# Patient Record
Sex: MALE | Race: WHITE | NOT HISPANIC OR LATINO | Employment: OTHER | URBAN - METROPOLITAN AREA
[De-identification: names, ages, dates, MRNs, and addresses within clinical notes are randomized per-mention and may not be internally consistent; named-entity substitution may affect disease eponyms.]

---

## 2023-09-05 ENCOUNTER — HOSPITAL ENCOUNTER (OUTPATIENT)
Dept: DATA CONVERSION | Facility: HOSPITAL | Age: 88
Discharge: HOME | End: 2023-09-05
Payer: MEDICARE

## 2023-09-05 DIAGNOSIS — Z01.89 ENCOUNTER FOR OTHER SPECIFIED SPECIAL EXAMINATIONS: ICD-10-CM

## 2023-09-05 DIAGNOSIS — I10 ESSENTIAL (PRIMARY) HYPERTENSION: ICD-10-CM

## 2023-09-05 LAB
ALBUMIN SERPL-MCNC: 4.3 GM/DL (ref 3.5–5)
ALBUMIN/GLOB SERPL: 1.7 RATIO (ref 1.5–3)
ALP BLD-CCNC: 111 U/L (ref 35–125)
ALT SERPL-CCNC: 14 U/L (ref 5–40)
ANION GAP SERPL CALCULATED.3IONS-SCNC: 13 MMOL/L (ref 0–19)
AST SERPL-CCNC: 18 U/L (ref 5–40)
BASOPHILS # BLD AUTO: 0.04 K/UL (ref 0–0.22)
BASOPHILS NFR BLD AUTO: 0.7 % (ref 0–1)
BILIRUB DIRECT SERPL-MCNC: 0.2 MG/DL (ref 0–0.2)
BILIRUB INDIRECT SERPL-MCNC: 0.3 MG/DL (ref 0–0.8)
BILIRUB SERPL-MCNC: 0.5 MG/DL (ref 0.1–1.2)
BUN SERPL-MCNC: 32 MG/DL (ref 8–25)
BUN/CREAT SERPL: 20 RATIO (ref 8–21)
CALCIUM SERPL-MCNC: 9.3 MG/DL (ref 8.5–10.4)
CHLORIDE SERPL-SCNC: 102 MMOL/L (ref 97–107)
CO2 SERPL-SCNC: 26 MMOL/L (ref 24–31)
CREAT SERPL-MCNC: 1.6 MG/DL (ref 0.4–1.6)
DEPRECATED RDW RBC AUTO: 51.1 FL (ref 37–54)
DIFFERENTIAL METHOD BLD: ABNORMAL
EOSINOPHIL # BLD AUTO: 0.15 K/UL (ref 0–0.45)
EOSINOPHIL NFR BLD: 2.6 % (ref 0–3)
ERYTHROCYTE [DISTWIDTH] IN BLOOD BY AUTOMATED COUNT: 15.2 % (ref 11.7–15)
GFR SERPL CREATININE-BSD FRML MDRD: 40 ML/MIN/1.73 M2
GLOBULIN SER-MCNC: 2.5 G/DL (ref 1.9–3.7)
GLUCOSE SERPL-MCNC: 97 MG/DL (ref 65–99)
HCT VFR BLD AUTO: 38.7 % (ref 41–50)
HGB BLD-MCNC: 12 GM/DL (ref 13.5–16.5)
IMM GRANULOCYTES # BLD AUTO: 0.01 K/UL (ref 0–0.1)
LYMPHOCYTES # BLD AUTO: 1.04 K/UL (ref 1.2–3.2)
LYMPHOCYTES NFR BLD MANUAL: 18.1 % (ref 20–40)
MCH RBC QN AUTO: 28.3 PG (ref 26–34)
MCHC RBC AUTO-ENTMCNC: 31 % (ref 31–37)
MCV RBC AUTO: 91.3 FL (ref 80–100)
MONOCYTES # BLD AUTO: 0.42 K/UL (ref 0–0.8)
MONOCYTES NFR BLD MANUAL: 7.3 % (ref 0–8)
NEUTROPHILS # BLD AUTO: 4.09 K/UL
NEUTROPHILS # BLD AUTO: 4.09 K/UL (ref 1.8–7.7)
NEUTROPHILS.IMMATURE NFR BLD: 0.2 % (ref 0–1)
NEUTS SEG NFR BLD: 71.1 % (ref 50–70)
NRBC BLD-RTO: 0 /100 WBC
PLATELET # BLD AUTO: 161 K/UL (ref 150–450)
PMV BLD AUTO: 10.8 CU (ref 7–12.6)
POTASSIUM SERPL-SCNC: 4.4 MMOL/L (ref 3.4–5.1)
PROT SERPL-MCNC: 6.8 G/DL (ref 5.9–7.9)
RBC # BLD AUTO: 4.24 M/UL (ref 4.5–5.5)
SODIUM SERPL-SCNC: 141 MMOL/L (ref 133–145)
WBC # BLD AUTO: 5.8 K/UL (ref 4.5–11)

## 2023-09-17 PROBLEM — R26.9 ABNORMAL GAIT: Status: ACTIVE | Noted: 2023-09-17

## 2023-09-17 PROBLEM — M25.069 HEMARTHROSIS OF KNEE: Status: ACTIVE | Noted: 2023-09-17

## 2023-09-17 PROBLEM — T78.3XXA ANGIOEDEMA: Status: ACTIVE | Noted: 2023-09-17

## 2023-09-17 PROBLEM — I25.10 ATHEROSCLEROTIC HEART DISEASE OF NATIVE CORONARY ARTERY WITHOUT ANGINA PECTORIS: Status: ACTIVE | Noted: 2023-09-17

## 2023-09-17 PROBLEM — H81.10 BENIGN PAROXYSMAL POSITIONAL VERTIGO: Status: ACTIVE | Noted: 2023-09-17

## 2023-09-17 PROBLEM — E78.2 MIXED HYPERLIPIDEMIA: Status: ACTIVE | Noted: 2023-09-17

## 2023-09-17 PROBLEM — R42 DIZZINESS AND GIDDINESS: Status: ACTIVE | Noted: 2023-09-17

## 2023-09-17 PROBLEM — R41.3 MEMORY IMPAIRMENT: Status: ACTIVE | Noted: 2023-09-17

## 2023-09-17 PROBLEM — E55.9 VITAMIN D DEFICIENCY: Status: ACTIVE | Noted: 2023-09-17

## 2023-09-17 PROBLEM — Z95.5 HISTORY OF CORONARY ARTERY STENT PLACEMENT: Status: ACTIVE | Noted: 2023-09-17

## 2023-09-17 PROBLEM — R47.81 SLURRED SPEECH: Status: ACTIVE | Noted: 2023-09-17

## 2023-09-17 PROBLEM — M15.0 PRIMARY OSTEOARTHRITIS INVOLVING MULTIPLE JOINTS: Status: ACTIVE | Noted: 2023-09-17

## 2023-09-17 PROBLEM — M15.9 PRIMARY OSTEOARTHRITIS INVOLVING MULTIPLE JOINTS: Status: ACTIVE | Noted: 2023-09-17

## 2023-09-17 PROBLEM — R42 LIGHTHEADEDNESS: Status: ACTIVE | Noted: 2023-09-17

## 2023-09-17 PROBLEM — R73.03 PREDIABETES: Status: ACTIVE | Noted: 2023-09-17

## 2023-09-17 PROBLEM — I48.91 ATRIAL FIBRILLATION (MULTI): Status: ACTIVE | Noted: 2023-09-17

## 2023-09-17 PROBLEM — N18.9 CHRONIC RENAL FAILURE SYNDROME: Status: ACTIVE | Noted: 2023-09-17

## 2023-09-17 PROBLEM — I87.2 VENOUS INSUFFICIENCY: Status: ACTIVE | Noted: 2023-09-17

## 2023-09-17 PROBLEM — M85.00 FIBROUS DYSPLASIA, MONOSTOTIC: Status: ACTIVE | Noted: 2023-09-17

## 2023-09-17 PROBLEM — T14.8XXA HEMATOMA: Status: ACTIVE | Noted: 2023-09-17

## 2023-09-17 PROBLEM — I10 ESSENTIAL HYPERTENSION: Status: ACTIVE | Noted: 2023-09-17

## 2023-09-17 PROBLEM — K40.90 RIGHT INGUINAL HERNIA: Status: ACTIVE | Noted: 2023-09-17

## 2023-09-17 PROBLEM — R42 VERTIGO: Status: ACTIVE | Noted: 2023-09-17

## 2023-09-17 PROBLEM — N40.0 BENIGN PROSTATIC HYPERPLASIA: Status: ACTIVE | Noted: 2023-09-17

## 2023-09-17 PROBLEM — H90.3 SENSORINEURAL HEARING LOSS, BILATERAL: Status: ACTIVE | Noted: 2023-09-17

## 2023-09-17 RX ORDER — CEPHALEXIN 500 MG/1
500 CAPSULE ORAL 2 TIMES DAILY
COMMUNITY
Start: 2023-04-08 | End: 2023-11-09 | Stop reason: ALTCHOICE

## 2023-09-17 RX ORDER — AMLODIPINE BESYLATE 5 MG/1
TABLET ORAL
COMMUNITY
End: 2024-03-04 | Stop reason: SDUPTHER

## 2023-09-17 RX ORDER — VIT A/VIT C/VIT E/ZINC/COPPER 2148-113
TABLET ORAL
COMMUNITY

## 2023-09-17 RX ORDER — TORSEMIDE 20 MG/1
30 TABLET ORAL DAILY
COMMUNITY
Start: 2023-06-19 | End: 2024-03-04 | Stop reason: SDUPTHER

## 2023-09-17 RX ORDER — ERGOCALCIFEROL 1.25 MG/1
CAPSULE ORAL
COMMUNITY
Start: 2023-08-17 | End: 2024-02-07

## 2023-09-17 RX ORDER — CHOLECALCIFEROL (VITAMIN D3) 125 MCG
TABLET ORAL
COMMUNITY
End: 2023-11-09 | Stop reason: CLARIF

## 2023-09-17 RX ORDER — FINASTERIDE 5 MG/1
5 TABLET, FILM COATED ORAL DAILY
COMMUNITY
End: 2024-03-04 | Stop reason: SDUPTHER

## 2023-09-17 RX ORDER — AMLODIPINE BESYLATE 10 MG/1
10 TABLET ORAL DAILY
COMMUNITY
End: 2024-03-04 | Stop reason: ALTCHOICE

## 2023-09-17 RX ORDER — CHOLECALCIFEROL (VITAMIN D3) 25 MCG
TABLET ORAL
COMMUNITY
Start: 2023-03-03

## 2023-09-17 RX ORDER — SIMVASTATIN 20 MG/1
20 TABLET, FILM COATED ORAL DAILY
COMMUNITY
End: 2024-03-04 | Stop reason: SDUPTHER

## 2023-09-17 RX ORDER — FUROSEMIDE 20 MG/1
TABLET ORAL
COMMUNITY
End: 2024-03-04 | Stop reason: ALTCHOICE

## 2023-09-17 RX ORDER — EPINEPHRINE 0.3 MG/.3ML
INJECTION INTRAMUSCULAR
COMMUNITY
Start: 2016-10-13

## 2023-09-17 RX ORDER — DEXTROMETHORPHAN HYDROBROMIDE, GUAIFENESIN 5; 100 MG/5ML; MG/5ML
LIQUID ORAL
COMMUNITY
End: 2024-03-04 | Stop reason: SDUPTHER

## 2023-09-17 RX ORDER — METOPROLOL SUCCINATE 25 MG/1
25 TABLET, EXTENDED RELEASE ORAL DAILY
COMMUNITY
End: 2024-03-04 | Stop reason: SDUPTHER

## 2023-09-17 RX ORDER — ASPIRIN 81 MG/1
TABLET ORAL
COMMUNITY
Start: 2023-03-03 | End: 2024-03-04 | Stop reason: SDUPTHER

## 2023-09-17 RX ORDER — DICLOFENAC SODIUM 1 MG/ML
SOLUTION/ DROPS OPHTHALMIC
COMMUNITY
Start: 2023-04-12

## 2023-09-17 RX ORDER — DOXYCYCLINE 100 MG/1
100 CAPSULE ORAL 2 TIMES DAILY
COMMUNITY
Start: 2022-12-05 | End: 2022-12-15

## 2023-09-29 ENCOUNTER — HOSPITAL ENCOUNTER (OUTPATIENT)
Dept: DATA CONVERSION | Facility: HOSPITAL | Age: 88
Discharge: HOME | End: 2023-09-29
Payer: MEDICARE

## 2023-10-10 ENCOUNTER — HOSPITAL ENCOUNTER (EMERGENCY)
Facility: HOSPITAL | Age: 88
Discharge: HOME | End: 2023-10-10
Attending: EMERGENCY MEDICINE
Payer: MEDICARE

## 2023-10-10 VITALS
WEIGHT: 175 LBS | HEART RATE: 64 BPM | HEIGHT: 71 IN | SYSTOLIC BLOOD PRESSURE: 171 MMHG | BODY MASS INDEX: 24.5 KG/M2 | DIASTOLIC BLOOD PRESSURE: 72 MMHG | OXYGEN SATURATION: 98 % | TEMPERATURE: 97.9 F | RESPIRATION RATE: 16 BRPM

## 2023-10-10 DIAGNOSIS — S61.211D LACERATION OF LEFT INDEX FINGER, FOREIGN BODY PRESENCE UNSPECIFIED, NAIL DAMAGE STATUS UNSPECIFIED, SUBSEQUENT ENCOUNTER: Primary | ICD-10-CM

## 2023-10-10 PROCEDURE — 99281 EMR DPT VST MAYX REQ PHY/QHP: CPT | Performed by: EMERGENCY MEDICINE

## 2023-10-10 ASSESSMENT — PAIN - FUNCTIONAL ASSESSMENT: PAIN_FUNCTIONAL_ASSESSMENT: 0-10

## 2023-10-10 ASSESSMENT — COLUMBIA-SUICIDE SEVERITY RATING SCALE - C-SSRS
2. HAVE YOU ACTUALLY HAD ANY THOUGHTS OF KILLING YOURSELF?: NO
6. HAVE YOU EVER DONE ANYTHING, STARTED TO DO ANYTHING, OR PREPARED TO DO ANYTHING TO END YOUR LIFE?: NO
1. IN THE PAST MONTH, HAVE YOU WISHED YOU WERE DEAD OR WISHED YOU COULD GO TO SLEEP AND NOT WAKE UP?: NO

## 2023-10-10 ASSESSMENT — PAIN SCALES - GENERAL: PAINLEVEL_OUTOF10: 0 - NO PAIN

## 2023-10-10 NOTE — ED PROVIDER NOTES
HPI   Chief Complaint   Patient presents with    Suture / Staple Removal       HPI  See my HPI                  No data recorded                Patient History   Past Medical History:   Diagnosis Date    Atherosclerotic heart disease of native coronary artery without angina pectoris     Coronary artery disease without angina pectoris, unspecified vessel or lesion type, unspecified whether native or transplanted heart    Personal history of other diseases of the circulatory system     History of hypertension    Personal history of other diseases of the circulatory system     History of atrial fibrillation     Past Surgical History:   Procedure Laterality Date    MR HEAD ANGIO WO IV CONTRAST  5/12/2018    MR HEAD ANGIO WO IV CONTRAST [facility]    OTHER SURGICAL HISTORY  02/17/2021    Rotator cuff repair    OTHER SURGICAL HISTORY  02/17/2021    Hernia repair    OTHER SURGICAL HISTORY  02/17/2021    Tonsillectomy with adenoidectomy    OTHER SURGICAL HISTORY  02/17/2021    Arterial stent placement     Family History   Problem Relation Name Age of Onset    Cancer Mother      Cancer Sibling       Social History     Tobacco Use    Smoking status: Not on file    Smokeless tobacco: Not on file   Substance Use Topics    Alcohol use: Not on file    Drug use: Not on file       Physical Exam   ED Triage Vitals [10/10/23 1316]   Temp Heart Rate Resp BP   36.6 °C (97.9 °F) 64 16 171/72      SpO2 Temp Source Heart Rate Source Patient Position   98 % Oral -- --      BP Location FiO2 (%)     -- --       Physical Exam  CONSTITUTIONAL: Vital signs reviewed as charted, well-developed and in no distress  Eyes: Extraocular muscles are intact. Pupils equal round and reactive to light. Conjunctiva are pink.    ENT: Mucous membranes are moist. Tongue in the midline. Pharynx was without erythema or exudates, uvula midline  LUNGS: Breath sounds equal and clear to auscultation. Good air exchange, no wheezes rales or retractions, pulse  oximetry is charted.  HEART: Regular rate and rhythm without murmur thrill or rub, strong tones, auscultation is normal.  ABDOMEN: Soft and nontender without guarding rebound rigidity or mass. Bowel sounds are present and normal in all quadrants. There is no palpable masses or aneurysms identified. No hepatosplenomegaly, normal abdominal exam.  Neuro: The patient is awake, alert and oriented ×3. Moving all 4 extremities and answering questions appropriately.   MUSCULOSKELETAL: The calves are nontender to palpation. Full gross active range of motion.  Examination of the left index finger shows a healing avulsion of the tip of the finger with sutures in place.  Does not appear to be fully healed at this time.  Motor sensation pulses are intact.  PSYCH: Awake alert oriented, normal mood and affect.  Skin:  Dry, normal color, warm to the touch, no rash present.      ED Course & MDM   Diagnoses as of 10/10/23 1333   Laceration of left index finger, foreign body presence unspecified, nail damage status unspecified, subsequent encounter       Medical Decision Making  History obtained from: patient    Vital signs, nursing notes, current medications, past medical history, Surgical history, allergies, social history, family History were reviewed.         HPI:  Patient is a 89-year-old gentleman presenting the ED today approximately 2 weeks status post sounds like amputation with revision of the left index finger by the ER doc at that time.  Still has the original bandage intact and did not follow-up with anybody.  Coming in today seeking reevaluation of the wound and suture removal.  The bandage was removed, the wound does appear well no evidence of erythema or drainage noted.  Does not appear to be ready for suture removal at this time.      10 point ROS was reviewed and negative except Noted above in HPI.  DDX: as listed above    MDM Summary/considerations:  I estimate there is LOW risk for CELLULITIS, COMPARTMENT SYNDROME,  NECROTIZING FASCIITIS, TENDON OR NEUROVASCULAR INJURY, or FOREIGN BODY, thus I consider the discharge disposition reasonable. Also, there is no evidence for peritonitis, sepsis, or toxicity. We have discussed the diagnosis and risks, and we agree with discharging home to follow-up with their primary doctor. We also discussed returning to the Emergency Department immediately if new or worsening symptoms occur. We have discussed the symptoms which are most concerning (e.g., changing or worsening pain, fever, numbness, weakness, cool or painful digits) that necessitate immediate return.       Patient's wound is healing well, do not recommend taking the sutures out today have recommended follow-up with orthopedics 1 to 2 days for reevaluation.  Orthopedic hand surgery referral was given.  He was discharged home in stable condition wound care was discussed.        All of the patient's questions were answered to the best of my ability.  Patient states understanding that they have been screened for an emergency today and we have not found any etiology of symptoms that requires emergent treatment or admission to the hospital at this point. They understand that they have not had definitive care day and require follow-up for treatment of their condition. They also state understanding that they may have an emergent condition that may potentially have not of detected at this visit and they must return to the emergency department if they develop any worsening of symptoms or new complaints.      Critical Care: Not warranted at this time    Prescriptions provided include: none    This chart was completed using voice recognition transcription software. Please excuse any errors of transcription including grammatical, punctuation, syntax and spelling errors.  Please contact me with any questions regarding this chart.    Procedure  Procedures     KARLEE Barber  10/10/23 8210

## 2023-10-10 NOTE — PROGRESS NOTES
Attestation note/supervisory note for GREG Starrren      The patient is a 89-year-old male presenting to the emergency department for a wound check.  He reportedly had a partial amputation of the tip of the left index finger on 29 September 2023.  He was seen in the emergency room at that time and had a partial revision with closure of the wound.  He was instructed to follow-up with orthopedics for further management of his injury but he admits to noncompliance with this instruction.  He was come to the emergency room today to see if his sutures are ready to come out.  The initial injury was due to a injury suffered while using hedge clippers at home.  He states that his tetanus was updated.  He denies any fever or chills.  No weakness or numbness.  No fever or chills.  No chest pain or shortness of breath.  No abdominal pain.  No nausea vomiting.  No diarrhea or constipation. no urinary complaints.  No other injury or trauma.  All pertinent positives and negatives are recorded above.  All other systems reviewed and otherwise negative.  Vital signs with hypertension but otherwise within normal limits.  Physical exam with a well-nourished well-developed male in no acute distress.  HEENT exam within normal limits.  He has no evidence of airway compromise or respiratory distress.  Abdominal exam is benign.  He has no gross motor, neurologic or vascular deficits on exam but he does have sutures in place on the tip of the left index finger.  No active bleeding at this time.  No wound dehiscence.      The patient does not require any emergent diagnostic labs or imaging at this time but the sutures are not ready to be removed at this time.      He was released in good condition.  He was instructed to follow-up with his primary care physician within 1 to 2 days for further management of his current symptoms, repeat check of his blood pressure and a wound check.  He was also given a referral to orthopedics and instructed to  follow-up within 1 to 2 days for further management of his current symptoms.  He will return to the emergency department sooner with worsening of symptoms or onset of new symptoms.      I personally saw the patient and performed a substantive portion of the visit including all aspects of the medical decision making.      Critical care time billing is not warranted at this time.        Alma Stockton MD

## 2023-10-11 PROBLEM — B35.1 ONYCHOMYCOSIS: Status: ACTIVE | Noted: 2021-07-30

## 2023-10-11 PROBLEM — M79.675 PAIN IN TOE OF LEFT FOOT: Status: ACTIVE | Noted: 2021-07-30

## 2023-10-11 PROBLEM — M79.674 TOE PAIN, RIGHT: Status: ACTIVE | Noted: 2021-07-30

## 2023-10-11 PROBLEM — I50.30 HEART FAILURE WITH PRESERVED EJECTION FRACTION (MULTI): Status: ACTIVE | Noted: 2023-01-05

## 2023-10-11 PROBLEM — I73.9 PERIPHERAL VASCULAR DISEASE (CMS-HCC): Status: ACTIVE | Noted: 2021-07-30

## 2023-10-11 PROBLEM — I48.20 CHRONIC ATRIAL FIBRILLATION (MULTI): Status: ACTIVE | Noted: 2023-01-05

## 2023-10-13 ENCOUNTER — OFFICE VISIT (OUTPATIENT)
Dept: ORTHOPEDIC SURGERY | Facility: CLINIC | Age: 88
End: 2023-10-13
Payer: MEDICARE

## 2023-10-13 VITALS — HEIGHT: 71 IN | BODY MASS INDEX: 24.08 KG/M2 | WEIGHT: 172 LBS

## 2023-10-13 DIAGNOSIS — S61.311A LACERATION OF LEFT INDEX FINGER WITH DAMAGE TO NAIL, FOREIGN BODY PRESENCE UNSPECIFIED, INITIAL ENCOUNTER: Primary | ICD-10-CM

## 2023-10-13 DIAGNOSIS — S68.611A COMPLETE TRAUMATIC AMPUTATION OF LEFT INDEX FINGER THROUGH PHALANX, INITIAL ENCOUNTER: ICD-10-CM

## 2023-10-13 PROCEDURE — 1125F AMNT PAIN NOTED PAIN PRSNT: CPT | Performed by: ORTHOPAEDIC SURGERY

## 2023-10-13 PROCEDURE — 1160F RVW MEDS BY RX/DR IN RCRD: CPT | Performed by: ORTHOPAEDIC SURGERY

## 2023-10-13 PROCEDURE — 99203 OFFICE O/P NEW LOW 30 MIN: CPT | Performed by: ORTHOPAEDIC SURGERY

## 2023-10-13 PROCEDURE — 99213 OFFICE O/P EST LOW 20 MIN: CPT | Performed by: ORTHOPAEDIC SURGERY

## 2023-10-13 PROCEDURE — 1036F TOBACCO NON-USER: CPT | Performed by: ORTHOPAEDIC SURGERY

## 2023-10-13 PROCEDURE — 1159F MED LIST DOCD IN RCRD: CPT | Performed by: ORTHOPAEDIC SURGERY

## 2023-10-13 ASSESSMENT — PATIENT HEALTH QUESTIONNAIRE - PHQ9
SUM OF ALL RESPONSES TO PHQ9 QUESTIONS 1 AND 2: 0
2. FEELING DOWN, DEPRESSED OR HOPELESS: NOT AT ALL
1. LITTLE INTEREST OR PLEASURE IN DOING THINGS: NOT AT ALL

## 2023-10-13 ASSESSMENT — PAIN SCALES - GENERAL: PAINLEVEL_OUTOF10: 2

## 2023-10-13 ASSESSMENT — PAIN - FUNCTIONAL ASSESSMENT: PAIN_FUNCTIONAL_ASSESSMENT: 0-10

## 2023-10-13 ASSESSMENT — ENCOUNTER SYMPTOMS
OCCASIONAL FEELINGS OF UNSTEADINESS: 1
DEPRESSION: 0
LOSS OF SENSATION IN FEET: 0

## 2023-10-13 NOTE — PROGRESS NOTES
Subjective    Patient ID: Moo Ventura is a 89 y.o. male.    Chief Complaint: Suture / Staple Removal of the Left Index Finger     Last Surgery: No surgery found  Last Surgery Date: No surgery found    HPI  This 89-year-old gentleman was trimming a tree branch on 9- when he accidentally suffered a laceration to his left index finger through the distal phalanx which resulted in both a laceration and traumatic amputation.  He was seen at [facility] emergency department on 9- where he was evaluated with physical examination and x-rays, treated with cleansing and suturing and application of a dressing and splint and referred.  He comes in today stating that he is tolerating his left index finger pain reasonably well    Review of systems:    Cardiology: No chest pain, no shortness of breath    Respiratory: No chest pain, no shortness of breath    Neurologic: Other than loss of sensation at the area of traumatic amputation of the left index finger there is no numbness    Musculoskeletal: See history above.      Objective   Ortho Exam  General: This is a pleasant patient in no respiratory or pulmonary distress    Musculoskeletal: Left index finger: The splint and dressing are removed.  There is a traumatic amputation through the distal phalanx of the left index finger.  This has been sutured.  X-rays done on 9- show traumatic amputation through the distal phalanx of the left index finger  Image Results:  XR fingers left 2+ views  PROCEDURE:         FINGERS LT MIN 2 VIEW - IXR  0169  REASON FOR EXAM: lac    RESULT: MRN: [id number]  Patient Name: MOO VENTURA    STUDY:  FINGERS LT MIN 2 VIEW; 9/29/2023 5:39 pm    INDICATION:  lac. /cut tip of finger with electric hedge clippers    COMPARISON:  None    ACCESSION NUMBER(S):  [id number]    ORDERING CLINICIAN:  MARY ANN IRELAND    FINDINGS:  There is a tuft fracture of the 2nd digit with 5 mm tuft fracture fragment  dissociated from the distal phalanx.  Overlying laceration through the soft  tissues can be seen.    IMPRESSION:  Open fracture of the 2nd distal phalanx involving the tuft as above.    Dictation workstation:   [id number]    Original Interpreting Physician:   MAREK BOLES MD  Original Transcribed by/Date: MMJOSUE Sep 29 2023  5:13P  Original Electronically Signed by/Date: MAREK BOLES MD Sep 29 2023  5:44P    Addendum Interpreting Physician:  Addendum Transcribed by/Date: NO ADDENDUM  Addendum Electronically Signed by/Date:    Options are discussed with the patient in detail.  The tip of the index finger is cleansed and sutures are removed.  Sterile dressing and splint are reapplied.  The patient is instructed to keep the splint and dressing clean, dry and covered at all times and is further instructed regarding the appropriate use of Tylenol as needed for pain with its potential adverse reactions and side effects. The patient understands.  Turn in 2 weeks for reevaluation or sooner as needed.  Please note that this report has been produced using speech recognition software.  It may contain errors related to grammar, punctuation or spelling.  Electronically signed, but not reviewed.  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    No follow-ups on file.

## 2023-10-26 ENCOUNTER — OFFICE VISIT (OUTPATIENT)
Dept: ORTHOPEDIC SURGERY | Facility: CLINIC | Age: 88
End: 2023-10-26
Payer: MEDICARE

## 2023-10-26 VITALS — WEIGHT: 170 LBS | HEIGHT: 71 IN | BODY MASS INDEX: 23.8 KG/M2

## 2023-10-26 DIAGNOSIS — S68.611A COMPLETE TRAUMATIC AMPUTATION OF LEFT INDEX FINGER THROUGH PHALANX, INITIAL ENCOUNTER: ICD-10-CM

## 2023-10-26 DIAGNOSIS — S61.311A LACERATION OF LEFT INDEX FINGER WITH DAMAGE TO NAIL, FOREIGN BODY PRESENCE UNSPECIFIED, INITIAL ENCOUNTER: Primary | ICD-10-CM

## 2023-10-26 PROCEDURE — 99213 OFFICE O/P EST LOW 20 MIN: CPT | Performed by: PHYSICIAN ASSISTANT

## 2023-10-26 PROCEDURE — 1125F AMNT PAIN NOTED PAIN PRSNT: CPT | Performed by: PHYSICIAN ASSISTANT

## 2023-10-26 PROCEDURE — 1036F TOBACCO NON-USER: CPT | Performed by: PHYSICIAN ASSISTANT

## 2023-10-26 PROCEDURE — 1160F RVW MEDS BY RX/DR IN RCRD: CPT | Performed by: PHYSICIAN ASSISTANT

## 2023-10-26 PROCEDURE — 1159F MED LIST DOCD IN RCRD: CPT | Performed by: PHYSICIAN ASSISTANT

## 2023-10-26 ASSESSMENT — PATIENT HEALTH QUESTIONNAIRE - PHQ9
2. FEELING DOWN, DEPRESSED OR HOPELESS: NOT AT ALL
1. LITTLE INTEREST OR PLEASURE IN DOING THINGS: NOT AT ALL
SUM OF ALL RESPONSES TO PHQ9 QUESTIONS 1 AND 2: 0

## 2023-10-26 ASSESSMENT — PAIN - FUNCTIONAL ASSESSMENT: PAIN_FUNCTIONAL_ASSESSMENT: 0-10

## 2023-10-26 ASSESSMENT — PAIN SCALES - GENERAL: PAINLEVEL_OUTOF10: 2

## 2023-10-26 ASSESSMENT — ENCOUNTER SYMPTOMS
DEPRESSION: 0
OCCASIONAL FEELINGS OF UNSTEADINESS: 0
LOSS OF SENSATION IN FEET: 0

## 2023-10-26 NOTE — PROGRESS NOTES
Subjective    Patient ID: Moo Ventura is a 89 y.o. male.    Chief Complaint: Suture / Staple Removal of the Left Index Finger     Last Surgery: No surgery found  Last Surgery Date: No surgery found    HPI  This 89-year-old gentleman was trimming a tree branch on 9- when he accidentally suffered a laceration to his left index finger through the distal phalanx which resulted in both a laceration and traumatic amputation.  He was seen at [facility] emergency department on 9- where he was evaluated with physical examination and x-rays, treated with cleansing and suturing and application of a dressing and splint and referred.  Dr. Aguillon previously evaluated him and treated him with dressing change and suture removal. He comes in today stating that he is tolerating his left index finger pain reasonably well    Review of systems:    Cardiology: No chest pain, no shortness of breath    Respiratory: No chest pain, no shortness of breath    Neurologic: Other than loss of sensation at the area of traumatic amputation of the left index finger there is no numbness    Musculoskeletal: See history above.      Objective   Ortho Exam  General: This is a pleasant patient in no respiratory or pulmonary distress    Musculoskeletal: Left index finger: The splint and dressing are removed.  There is a traumatic amputation through the distal phalanx of the left index finger. It is clean and dry.  X-rays done on 9- show traumatic amputation through the distal phalanx of the left index finger  Image Results:  XR fingers left 2+ views  PROCEDURE:         FINGERS LT MIN 2 VIEW - IXR  0169  REASON FOR EXAM: lac    RESULT: MRN: [id number]  Patient Name: MOO VENTURA    STUDY:  FINGERS LT MIN 2 VIEW; 9/29/2023 5:39 pm    INDICATION:  lac. /cut tip of finger with electric hedge clippers    COMPARISON:  None    ACCESSION NUMBER(S):  [id number]    ORDERING CLINICIAN:  MARY ANN IRELAND    FINDINGS:  There is a tuft fracture of  the 2nd digit with 5 mm tuft fracture fragment  dissociated from the distal phalanx. Overlying laceration through the soft  tissues can be seen.    IMPRESSION:  Open fracture of the 2nd distal phalanx involving the tuft as above.    Dictation workstation:   [id number]    Original Interpreting Physician:   MAREK BOLES MD  Original Transcribed by/Date: MMODAL Sep 29 2023  5:13P  Original Electronically Signed by/Date: MAREK BOLES MD Sep 29 2023  5:44P    Addendum Interpreting Physician:  Addendum Transcribed by/Date: NO ADDENDUM  Addendum Electronically Signed by/Date:    Options are discussed with the patient in detail.  The tip of the index finger is cleansed.  Sterile dressing is reapplied.  The patient is instructed to keep the dressing clean, dry and covered at all times and is further instructed regarding the appropriate use of Tylenol as needed for pain with its potential adverse reactions and side effects. The patient understands.  Turn in 2 weeks for reevaluation or sooner as needed.  Please note that this report has been produced using speech recognition software.  It may contain errors related to grammar, punctuation or spelling.  Electronically signed, but not reviewed.  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    No follow-ups on file.

## 2023-10-26 NOTE — PATIENT INSTRUCTIONS
Thank you for coming to see us today!     Continue to use tylenol for pain control.   Keep laceration clean dry and covered. Change band aid as needed.  Follow up in 2 weeks

## 2023-11-09 ENCOUNTER — OFFICE VISIT (OUTPATIENT)
Dept: ORTHOPEDIC SURGERY | Facility: CLINIC | Age: 88
End: 2023-11-09
Payer: MEDICARE

## 2023-11-09 VITALS — HEIGHT: 69 IN | BODY MASS INDEX: 25.14 KG/M2 | WEIGHT: 169.75 LBS

## 2023-11-09 DIAGNOSIS — S68.611A COMPLETE TRAUMATIC AMPUTATION OF LEFT INDEX FINGER THROUGH PHALANX, INITIAL ENCOUNTER: ICD-10-CM

## 2023-11-09 DIAGNOSIS — S61.311A LACERATION OF LEFT INDEX FINGER WITH DAMAGE TO NAIL, FOREIGN BODY PRESENCE UNSPECIFIED, INITIAL ENCOUNTER: Primary | ICD-10-CM

## 2023-11-09 PROCEDURE — 99213 OFFICE O/P EST LOW 20 MIN: CPT | Performed by: PHYSICIAN ASSISTANT

## 2023-11-09 PROCEDURE — 1159F MED LIST DOCD IN RCRD: CPT | Performed by: PHYSICIAN ASSISTANT

## 2023-11-09 PROCEDURE — 3074F SYST BP LT 130 MM HG: CPT | Performed by: PHYSICIAN ASSISTANT

## 2023-11-09 PROCEDURE — 1160F RVW MEDS BY RX/DR IN RCRD: CPT | Performed by: PHYSICIAN ASSISTANT

## 2023-11-09 PROCEDURE — 1125F AMNT PAIN NOTED PAIN PRSNT: CPT | Performed by: PHYSICIAN ASSISTANT

## 2023-11-09 PROCEDURE — 3078F DIAST BP <80 MM HG: CPT | Performed by: PHYSICIAN ASSISTANT

## 2023-11-09 PROCEDURE — 1036F TOBACCO NON-USER: CPT | Performed by: PHYSICIAN ASSISTANT

## 2023-11-09 ASSESSMENT — ENCOUNTER SYMPTOMS
DEPRESSION: 0
OCCASIONAL FEELINGS OF UNSTEADINESS: 0
LOSS OF SENSATION IN FEET: 0

## 2023-11-09 ASSESSMENT — PAIN - FUNCTIONAL ASSESSMENT: PAIN_FUNCTIONAL_ASSESSMENT: 0-10

## 2023-11-09 ASSESSMENT — PATIENT HEALTH QUESTIONNAIRE - PHQ9
2. FEELING DOWN, DEPRESSED OR HOPELESS: NOT AT ALL
SUM OF ALL RESPONSES TO PHQ9 QUESTIONS 1 AND 2: 0
1. LITTLE INTEREST OR PLEASURE IN DOING THINGS: NOT AT ALL

## 2023-11-09 ASSESSMENT — PAIN DESCRIPTION - DESCRIPTORS: DESCRIPTORS: ACHING

## 2023-11-09 ASSESSMENT — PAIN SCALES - GENERAL: PAINLEVEL_OUTOF10: 1

## 2023-11-09 NOTE — PROGRESS NOTES
Subjective    Patient ID: Moo Ventura is a 89 y.o. male.    Chief Complaint: Suture / Staple Removal of the Left Index Finger     Last Surgery: No surgery found  Last Surgery Date: No surgery found    HPI  This 89-year-old gentleman was trimming a tree branch on 9- when he accidentally suffered a laceration to his left index finger through the distal phalanx which resulted in both a laceration and traumatic amputation.  He was seen at [facility] emergency department on 9- where he was evaluated with physical examination and x-rays, treated with cleansing and suturing and application of a dressing and splint and referred.  Dr. Aguillon previously evaluated him and treated him with dressing change and suture removal. He comes in today stating that he is tolerating his left index finger pain reasonably well    Review of systems:    Cardiology: No chest pain, no shortness of breath    Respiratory: No chest pain, no shortness of breath    Neurologic: Other than loss of sensation at the area of traumatic amputation of the left index finger there is no numbness    Musculoskeletal: See history above.      Objective   Ortho Exam  General: This is a pleasant patient in no respiratory or pulmonary distress    Musculoskeletal: Left index finger: The splint and dressing are removed.  There is a traumatic amputation through the distal phalanx of the left index finger. It is clean and dry. Nail has been removed.  X-rays done on 9- show traumatic amputation through the distal phalanx of the left index finger  Image Results:  XR fingers left 2+ views  PROCEDURE:         FINGERS LT MIN 2 VIEW - IXR  0169  REASON FOR EXAM: lac    RESULT: MRN: [id number]  Patient Name: MOO VENTURA    STUDY:  FINGERS LT MIN 2 VIEW; 9/29/2023 5:39 pm    INDICATION:  lac. /cut tip of finger with electric hedge clippers    COMPARISON:  None    ACCESSION NUMBER(S):  [id number]    ORDERING CLINICIAN:  MARY ANN IRELAND    FINDINGS:  There  is a tuft fracture of the 2nd digit with 5 mm tuft fracture fragment  dissociated from the distal phalanx. Overlying laceration through the soft  tissues can be seen.    IMPRESSION:  Open fracture of the 2nd distal phalanx involving the tuft as above.    Dictation workstation:   [id number]    Original Interpreting Physician:   MAREK BOLES MD  Original Transcribed by/Date: MMODAL Sep 29 2023  5:13P  Original Electronically Signed by/Date: MAREK BOLES MD Sep 29 2023  5:44P    Addendum Interpreting Physician:  Addendum Transcribed by/Date: NO ADDENDUM  Addendum Electronically Signed by/Date:    Options are discussed with the patient in detail.  The tip of the index finger is cleansed.  Sterile dressing is reapplied.  The patient is instructed to keep the dressing clean, dry and covered at all times while outside and is further instructed regarding the appropriate use of Tylenol as needed for pain with its potential adverse reactions and side effects. The patient understands.  Turn in 6 weeks for reevaluation or sooner as needed.  Please note that this report has been produced using speech recognition software.  It may contain errors related to grammar, punctuation or spelling.  Electronically signed, but not reviewed.  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    No follow-ups on file.

## 2023-11-09 NOTE — PATIENT INSTRUCTIONS
Thank you for coming to see us today!     Continue to use tylenol for pain control.   Keep laceration clean dry and covered when outside. May remove dressing in side and at night. . Change band aid as needed.  Follow up in 6 weeks

## 2023-12-21 ENCOUNTER — OFFICE VISIT (OUTPATIENT)
Dept: ORTHOPEDIC SURGERY | Facility: CLINIC | Age: 88
End: 2023-12-21
Payer: MEDICARE

## 2023-12-21 VITALS — WEIGHT: 169.75 LBS | BODY MASS INDEX: 25.07 KG/M2

## 2023-12-21 DIAGNOSIS — S68.611A COMPLETE TRAUMATIC AMPUTATION OF LEFT INDEX FINGER THROUGH PHALANX, INITIAL ENCOUNTER: ICD-10-CM

## 2023-12-21 DIAGNOSIS — S61.311A LACERATION OF LEFT INDEX FINGER WITH DAMAGE TO NAIL, FOREIGN BODY PRESENCE UNSPECIFIED, INITIAL ENCOUNTER: Primary | ICD-10-CM

## 2023-12-21 PROCEDURE — 1160F RVW MEDS BY RX/DR IN RCRD: CPT | Performed by: PHYSICIAN ASSISTANT

## 2023-12-21 PROCEDURE — 1159F MED LIST DOCD IN RCRD: CPT | Performed by: PHYSICIAN ASSISTANT

## 2023-12-21 PROCEDURE — 99213 OFFICE O/P EST LOW 20 MIN: CPT | Performed by: PHYSICIAN ASSISTANT

## 2023-12-21 PROCEDURE — 1036F TOBACCO NON-USER: CPT | Performed by: PHYSICIAN ASSISTANT

## 2023-12-21 PROCEDURE — 1126F AMNT PAIN NOTED NONE PRSNT: CPT | Performed by: PHYSICIAN ASSISTANT

## 2023-12-21 ASSESSMENT — PAIN SCALES - GENERAL: PAINLEVEL_OUTOF10: 0 - NO PAIN

## 2023-12-21 ASSESSMENT — ENCOUNTER SYMPTOMS
DEPRESSION: 0
LOSS OF SENSATION IN FEET: 0
OCCASIONAL FEELINGS OF UNSTEADINESS: 0

## 2023-12-21 ASSESSMENT — PAIN - FUNCTIONAL ASSESSMENT: PAIN_FUNCTIONAL_ASSESSMENT: 0-10

## 2023-12-21 ASSESSMENT — LIFESTYLE VARIABLES: TOTAL SCORE: 0

## 2023-12-21 ASSESSMENT — PAIN DESCRIPTION - DESCRIPTORS: DESCRIPTORS: ACHING

## 2023-12-21 NOTE — PATIENT INSTRUCTIONS
Thank you for coming to see us today!     Continue to use tylenol for pain control.   See us if you noticed any signs of infections     Follow up as needed.

## 2023-12-21 NOTE — PROGRESS NOTES
Subjective    Patient ID: Moo Rodriguez is a 89 y.o. male.    Chief Complaint: Suture / Staple Removal of the Left Index Finger     Last Surgery: No surgery found  Last Surgery Date: No surgery found    HPI  This 89-year-old gentleman was trimming a tree branch on 9- when he accidentally suffered a laceration to his left index finger through the distal phalanx which resulted in both a laceration and traumatic amputation.  He was seen at [facility] emergency department on 9- where he was evaluated with physical examination and x-rays, treated with cleansing and suturing and application of a dressing and splint and referred.  Dr. Aguillon previously evaluated him and treated him with dressing change and suture removal. He comes in today stating that he is tolerating his left index finger pain reasonably well. He is resuming normal activities.     Review of systems:    Cardiology: No chest pain, no shortness of breath    Respiratory: No chest pain, no shortness of breath    Neurologic: Other than loss of sensation at the area of traumatic amputation of the left index finger there is no numbness    Musculoskeletal: See history above.      Objective   GENERAL:          General Appearance:  This is a pleasant patient with appropriate affect, in no acute distress.   DERMATOLOGY:          Skin: skin at the neck, upper and lower back, and trunk is intact. There is no evidence of skin rash, skin breakdown or ulceration, or atrophic skin change.   EXTREMITIES:          Vascular:  Right, left hands and feet are warm with good color and pulses. Right and left calf and thigh are nontender and nonswollen.   NEUROLOGICAL:          Orientation:  Patient is alert and oriented to person, place, time and situation. Right and left upper and lower extremity motor and sensory examinations are intact.      MUSCULOSKELETAL: Neck: Nontender. No pain with range of motion.  Musculoskeletal: Left index finger:  There is a  traumatic amputation through the distal phalanx of the left index finger. It is clean and dry. Nail has grown back, and patient has successfully trimmed it. Neurovascular is intact. Deep and superficial tendons are intact. No signs of infection.  X-rays done on 9- show traumatic amputation through the distal phalanx of the left index finger    Image Results:  XR fingers left 2+ views  PROCEDURE:         FINGERS LT MIN 2 VIEW - IXR  0169  REASON FOR EXAM: lac    RESULT: MRN: [id number]  Patient Name: CLAY VENTURA    STUDY:  FINGERS LT MIN 2 VIEW; 9/29/2023 5:39 pm    INDICATION:  lac. /cut tip of finger with electric hedge clippers    COMPARISON:  None    ACCESSION NUMBER(S):  [id number]    ORDERING CLINICIAN:  MARY ANN IRELAND    FINDINGS:  There is a tuft fracture of the 2nd digit with 5 mm tuft fracture fragment  dissociated from the distal phalanx. Overlying laceration through the soft  tissues can be seen.    IMPRESSION:  Open fracture of the 2nd distal phalanx involving the tuft as above.    Dictation workstation:   [id number]    Original Interpreting Physician:   MAREK BOLES MD  Original Transcribed by/Date: MMODAL Sep 29 2023  5:13P  Original Electronically Signed by/Date: MAREK BOLES MD Sep 29 2023  5:44P    Addendum Interpreting Physician:  Addendum Transcribed by/Date: NO ADDENDUM  Addendum Electronically Signed by/Date:    Options are discussed with the patient in detail.  The patient is instructed regarding the appropriate use of Tylenol as needed for pain with its potential adverse reactions and side effects. He may resume activities as tolerated. He is instructed to file nail appropriately as it grows back in. The patient understands. Return as needed. Please note that this report has been produced using speech recognition software.  It may contain errors related to grammar, punctuation or spelling.  Electronically signed, but not reviewed.  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    No follow-ups on file.

## 2024-01-05 ENCOUNTER — OFFICE VISIT (OUTPATIENT)
Dept: OTOLARYNGOLOGY | Facility: CLINIC | Age: 89
End: 2024-01-05
Payer: MEDICARE

## 2024-01-05 VITALS — HEIGHT: 69 IN | WEIGHT: 178 LBS | BODY MASS INDEX: 26.36 KG/M2 | TEMPERATURE: 97.5 F

## 2024-01-05 DIAGNOSIS — H61.23 BILATERAL IMPACTED CERUMEN: Primary | ICD-10-CM

## 2024-01-05 DIAGNOSIS — H81.10 BENIGN PAROXYSMAL POSITIONAL VERTIGO, UNSPECIFIED LATERALITY: ICD-10-CM

## 2024-01-05 DIAGNOSIS — H90.3 SENSORINEURAL HEARING LOSS (SNHL) OF BOTH EARS: ICD-10-CM

## 2024-01-05 PROCEDURE — 1126F AMNT PAIN NOTED NONE PRSNT: CPT | Performed by: OTOLARYNGOLOGY

## 2024-01-05 PROCEDURE — 1036F TOBACCO NON-USER: CPT | Performed by: OTOLARYNGOLOGY

## 2024-01-05 PROCEDURE — 99213 OFFICE O/P EST LOW 20 MIN: CPT | Performed by: OTOLARYNGOLOGY

## 2024-01-05 NOTE — PROGRESS NOTES
Chief Complaint   Patient presents with    Cerumen Impaction     LOV: 6/2023 EAR CLEANING, PREFERS NO IRRIGATION       Date of Evaluation: 1/5/2024   HPI  He returns in follow-up for recurrent cerumen impactions.  His ears feel plugged.  Moo Rodriguez is a 89 y.o. male  with a history of hearing loss and recurrent BPPV and recurrent cerumen impactions. His VNG also shows central deficits. He underwent vestibular rehab. MRI of the head was essentially normal. 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        Past Medical History:   Diagnosis Date    Atherosclerotic heart disease of native coronary artery without angina pectoris     Coronary artery disease without angina pectoris, unspecified vessel or lesion type, unspecified whether native or transplanted heart    Finger laceration     left index    Hypertension     Personal history of other diseases of the circulatory system     History of hypertension    Personal history of other diseases of the circulatory system     History of atrial fibrillation      Past Surgical History:   Procedure Laterality Date    MR HEAD ANGIO WO IV CONTRAST  5/12/2018    MR HEAD ANGIO WO IV CONTRAST [facility]    OTHER SURGICAL HISTORY  02/17/2021    Rotator cuff repair    OTHER SURGICAL HISTORY  02/17/2021    Hernia repair    OTHER SURGICAL HISTORY  02/17/2021    Tonsillectomy with adenoidectomy    OTHER SURGICAL HISTORY  02/17/2021    Arterial stent placement          Medications:   Current Outpatient Medications   Medication Instructions    acetaminophen (Tylenol Arthritis Pain) 650 mg ER tablet 1 tablet as needed Orally every 6 hours    amLODIPine (Norvasc) 5 mg tablet 1 tablet Orally Once a day    amLODIPine (NORVASC) 10 mg, oral, Daily    aspirin 81 mg EC tablet 1 tablet Orally Once a day    cholecalciferol (Vitamin D-3) 25 MCG (1000 UT) tablet 1 tablet Orally Once a day    diclofenac (Voltaren) 0.1 %  "ophthalmic solution INSTILL 1 DROP IN THE RIGHT EYE 4 TIMES EVERY DAY FOR 2 DAYS AS NEEDED FOR PAIN THEN STOP    EPINEPHrine (EpiPen 2-Toño) 0.3 mg/0.3 mL injection syringe as directed Injection    ergocalciferol (Vitamin D-2) 1.25 MG (27502 UT) capsule TAKE 1 CAPSULE BY MOUTH ONE TIME PER WEEK FOR 90 DAYS    finasteride (PROSCAR) 5 mg, oral, Daily    Lasix 20 mg tablet 1 tablet Orally Once a day for 90 days    metoprolol succinate XL (TOPROL-XL) 25 mg, oral, Daily    simvastatin (ZOCOR) 20 mg, oral, Daily    torsemide (DEMADEX) 30 mg, oral, Daily    vitamins A,C,E-zinc-copper (PreserVision AREDS) 2,148 mcg-113 mg-45 mg-17.4mg tablet PreserVision AREDS TABS   Refills: 0       Active        Allergies:  Allergies   Allergen Reactions    Ace Inhibitors Angioedema        Physical Exam:  Last Recorded Vitals  Temperature 36.4 °C (97.5 °F), height 1.753 m (5' 9\"), weight 80.7 kg (178 lb).  []General appearance: Well-developed, well-nourished in no acute distress, conversant with normal voice quality    Head/face: No erythema or edema or facial tenderness, and normal facial nerve function bilaterally    External ear: Clear external auditory canals with normal pinnae  Tube status: N/A  Middle ear: Tympanic membranes intact and mobile, middle ears normal.  Tympanic membrane perforation: N/A  Mastoid bowl: N/A  Hearing: Normal conversational awareness at normal speech thresholds    Nose visualized using: Anterior rhinoscopy  Nasal dorsum: Nontraumatic midline appearance  Septum: Midline, nonobstructing  Inferior turbinates: Normal, pink  Secretions: Dry    Oral cavity and oropharynx: Normal  Teeth: Good condition  Floor of mouth: without lesions  Palate: Normal hard palate, soft palate and uvula  Oropharynx: Clear, no lesions present  Buccal mucosa: Normal without masses or lesions  Lips: Normal    Nasopharynx: Inadequate mirror exam secondary to gag/anatomy    Neck:  Salivary glands: Normal bilateral parotid and submandibular " glands by inspection and palpation.  Non-thyroid masses: No palpable masses or significant lymphadenopathy  Trachea: Midline  Thyroid: No thyromegaly or palpable nodules  Temporomandibular joint: Nontender  Cervical range of motion: Normal    Neurologic exam: Alert and oriented x3, appropriate affect.  Cranial nerves II-XII normal bilaterally  Extraocular movement: Extraocular movement intact, normal gaze alignment        Moo was seen today for cerumen impaction.  Diagnoses and all orders for this visit:  Bilateral impacted cerumen (Primary)  Sensorineural hearing loss (SNHL) of both ears  Benign paroxysmal positional vertigo, unspecified laterality       PLAN  His ears were cleaned today.  Feeling better.  Recheck in 6 months    Diallo Ni MD

## 2024-02-07 DIAGNOSIS — E55.9 VITAMIN D DEFICIENCY, UNSPECIFIED: ICD-10-CM

## 2024-02-07 RX ORDER — ERGOCALCIFEROL 1.25 MG/1
CAPSULE ORAL
Qty: 12 CAPSULE | Refills: 3 | Status: SHIPPED | OUTPATIENT
Start: 2024-02-07 | End: 2024-03-04 | Stop reason: SDUPTHER

## 2024-02-26 ENCOUNTER — LAB (OUTPATIENT)
Dept: LAB | Facility: LAB | Age: 89
End: 2024-02-26
Payer: MEDICARE

## 2024-02-26 DIAGNOSIS — E78.2 MIXED HYPERLIPIDEMIA: ICD-10-CM

## 2024-02-26 DIAGNOSIS — E55.9 VITAMIN D DEFICIENCY, UNSPECIFIED: ICD-10-CM

## 2024-02-26 DIAGNOSIS — I48.91 UNSPECIFIED ATRIAL FIBRILLATION (MULTI): ICD-10-CM

## 2024-02-26 DIAGNOSIS — Z01.89 ENCOUNTER FOR OTHER SPECIFIED SPECIAL EXAMINATIONS: Primary | ICD-10-CM

## 2024-02-26 DIAGNOSIS — R73.03 PREDIABETES: ICD-10-CM

## 2024-02-26 LAB
25(OH)D3 SERPL-MCNC: 71 NG/ML (ref 31–100)
ALBUMIN SERPL-MCNC: 4.3 G/DL (ref 3.5–5)
ALP BLD-CCNC: 114 U/L (ref 35–125)
ALT SERPL-CCNC: 9 U/L (ref 5–40)
ANION GAP SERPL CALC-SCNC: 12 MMOL/L
AST SERPL-CCNC: 15 U/L (ref 5–40)
BASOPHILS # BLD AUTO: 0.06 X10*3/UL (ref 0–0.1)
BASOPHILS NFR BLD AUTO: 0.9 %
BILIRUB DIRECT SERPL-MCNC: <0.2 MG/DL (ref 0–0.2)
BILIRUB SERPL-MCNC: 0.5 MG/DL (ref 0.1–1.2)
BUN SERPL-MCNC: 38 MG/DL (ref 8–25)
CALCIUM SERPL-MCNC: 9.7 MG/DL (ref 8.5–10.4)
CHLORIDE SERPL-SCNC: 105 MMOL/L (ref 97–107)
CHOLEST SERPL-MCNC: 111 MG/DL (ref 133–200)
CHOLEST/HDLC SERPL: 1.9 {RATIO}
CO2 SERPL-SCNC: 27 MMOL/L (ref 24–31)
CREAT SERPL-MCNC: 1.7 MG/DL (ref 0.4–1.6)
EGFRCR SERPLBLD CKD-EPI 2021: 37 ML/MIN/1.73M*2
EOSINOPHIL # BLD AUTO: 0.19 X10*3/UL (ref 0–0.4)
EOSINOPHIL NFR BLD AUTO: 2.8 %
ERYTHROCYTE [DISTWIDTH] IN BLOOD BY AUTOMATED COUNT: 14.4 % (ref 11.5–14.5)
EST. AVERAGE GLUCOSE BLD GHB EST-MCNC: 108 MG/DL
GLUCOSE SERPL-MCNC: 96 MG/DL (ref 65–99)
HBA1C MFR BLD: 5.4 %
HCT VFR BLD AUTO: 39.7 % (ref 41–52)
HDLC SERPL-MCNC: 58 MG/DL
HGB BLD-MCNC: 12.5 G/DL (ref 13.5–17.5)
IMM GRANULOCYTES # BLD AUTO: 0.01 X10*3/UL (ref 0–0.5)
IMM GRANULOCYTES NFR BLD AUTO: 0.1 % (ref 0–0.9)
LDLC SERPL CALC-MCNC: 42 MG/DL (ref 65–130)
LYMPHOCYTES # BLD AUTO: 1.63 X10*3/UL (ref 0.8–3)
LYMPHOCYTES NFR BLD AUTO: 24 %
MAGNESIUM SERPL-MCNC: 2.3 MG/DL (ref 1.6–3.1)
MCH RBC QN AUTO: 28.8 PG (ref 26–34)
MCHC RBC AUTO-ENTMCNC: 31.5 G/DL (ref 32–36)
MCV RBC AUTO: 92 FL (ref 80–100)
MONOCYTES # BLD AUTO: 0.55 X10*3/UL (ref 0.05–0.8)
MONOCYTES NFR BLD AUTO: 8.1 %
NEUTROPHILS # BLD AUTO: 4.36 X10*3/UL (ref 1.6–5.5)
NEUTROPHILS NFR BLD AUTO: 64.1 %
NRBC BLD-RTO: 0 /100 WBCS (ref 0–0)
PLATELET # BLD AUTO: 160 X10*3/UL (ref 150–450)
POTASSIUM SERPL-SCNC: 4.3 MMOL/L (ref 3.4–5.1)
PROT SERPL-MCNC: 6.6 G/DL (ref 5.9–7.9)
RBC # BLD AUTO: 4.34 X10*6/UL (ref 4.5–5.9)
SODIUM SERPL-SCNC: 144 MMOL/L (ref 133–145)
TRIGL SERPL-MCNC: 56 MG/DL (ref 40–150)
WBC # BLD AUTO: 6.8 X10*3/UL (ref 4.4–11.3)

## 2024-02-26 PROCEDURE — 83036 HEMOGLOBIN GLYCOSYLATED A1C: CPT

## 2024-02-26 PROCEDURE — 85025 COMPLETE CBC W/AUTO DIFF WBC: CPT

## 2024-02-26 PROCEDURE — 83735 ASSAY OF MAGNESIUM: CPT

## 2024-02-26 PROCEDURE — 36415 COLL VENOUS BLD VENIPUNCTURE: CPT

## 2024-02-26 PROCEDURE — 80053 COMPREHEN METABOLIC PANEL: CPT

## 2024-02-26 PROCEDURE — 82248 BILIRUBIN DIRECT: CPT

## 2024-02-26 PROCEDURE — 82306 VITAMIN D 25 HYDROXY: CPT

## 2024-02-26 PROCEDURE — 80061 LIPID PANEL: CPT

## 2024-03-04 ENCOUNTER — OFFICE VISIT (OUTPATIENT)
Dept: PRIMARY CARE | Facility: CLINIC | Age: 89
End: 2024-03-04
Payer: COMMERCIAL

## 2024-03-04 VITALS
OXYGEN SATURATION: 95 % | WEIGHT: 182 LBS | SYSTOLIC BLOOD PRESSURE: 132 MMHG | BODY MASS INDEX: 26.96 KG/M2 | HEIGHT: 69 IN | HEART RATE: 76 BPM | TEMPERATURE: 97.6 F | DIASTOLIC BLOOD PRESSURE: 60 MMHG

## 2024-03-04 DIAGNOSIS — E78.2 MIXED HYPERLIPIDEMIA: ICD-10-CM

## 2024-03-04 DIAGNOSIS — Z00.00 ANNUAL PHYSICAL EXAM: Primary | ICD-10-CM

## 2024-03-04 DIAGNOSIS — I50.32 CHRONIC DIASTOLIC HEART FAILURE (MULTI): ICD-10-CM

## 2024-03-04 DIAGNOSIS — Z71.89 COUNSELING REGARDING ADVANCED CARE PLANNING AND GOALS OF CARE: ICD-10-CM

## 2024-03-04 DIAGNOSIS — Z23 ENCOUNTER FOR IMMUNIZATION: ICD-10-CM

## 2024-03-04 DIAGNOSIS — M15.9 PRIMARY OSTEOARTHRITIS INVOLVING MULTIPLE JOINTS: ICD-10-CM

## 2024-03-04 DIAGNOSIS — Z01.89 ENCOUNTER FOR ROUTINE LABORATORY TESTING: ICD-10-CM

## 2024-03-04 DIAGNOSIS — I10 ESSENTIAL HYPERTENSION: ICD-10-CM

## 2024-03-04 DIAGNOSIS — R73.03 PREDIABETES: ICD-10-CM

## 2024-03-04 DIAGNOSIS — N40.0 BENIGN PROSTATIC HYPERPLASIA WITHOUT LOWER URINARY TRACT SYMPTOMS: ICD-10-CM

## 2024-03-04 DIAGNOSIS — R54 AGE-RELATED PHYSICAL DEBILITY: ICD-10-CM

## 2024-03-04 DIAGNOSIS — E55.9 VITAMIN D DEFICIENCY: ICD-10-CM

## 2024-03-04 DIAGNOSIS — N18.32 STAGE 3B CHRONIC KIDNEY DISEASE (MULTI): ICD-10-CM

## 2024-03-04 DIAGNOSIS — I48.0 PAROXYSMAL ATRIAL FIBRILLATION (MULTI): ICD-10-CM

## 2024-03-04 DIAGNOSIS — I73.9 PERIPHERAL VASCULAR DISEASE (CMS-HCC): ICD-10-CM

## 2024-03-04 DIAGNOSIS — I25.10 CORONARY ARTERY DISEASE INVOLVING NATIVE CORONARY ARTERY OF NATIVE HEART WITHOUT ANGINA PECTORIS: ICD-10-CM

## 2024-03-04 PROBLEM — M79.674 TOE PAIN, RIGHT: Status: RESOLVED | Noted: 2021-07-30 | Resolved: 2024-03-04

## 2024-03-04 PROBLEM — T14.8XXA HEMATOMA: Status: RESOLVED | Noted: 2023-09-17 | Resolved: 2024-03-04

## 2024-03-04 PROBLEM — M25.069 HEMARTHROSIS OF KNEE: Status: RESOLVED | Noted: 2023-09-17 | Resolved: 2024-03-04

## 2024-03-04 PROBLEM — R26.9 ABNORMAL GAIT: Status: RESOLVED | Noted: 2023-09-17 | Resolved: 2024-03-04

## 2024-03-04 PROBLEM — R42 LIGHTHEADEDNESS: Status: RESOLVED | Noted: 2023-09-17 | Resolved: 2024-03-04

## 2024-03-04 PROBLEM — R42 DIZZINESS AND GIDDINESS: Status: RESOLVED | Noted: 2023-09-17 | Resolved: 2024-03-04

## 2024-03-04 PROBLEM — R41.3 MEMORY IMPAIRMENT: Status: RESOLVED | Noted: 2023-09-17 | Resolved: 2024-03-04

## 2024-03-04 PROBLEM — I50.9 HEART FAILURE (MULTI): Status: ACTIVE | Noted: 2023-01-05

## 2024-03-04 PROBLEM — I87.2 VENOUS INSUFFICIENCY: Status: RESOLVED | Noted: 2023-09-17 | Resolved: 2024-03-04

## 2024-03-04 PROBLEM — S61.311A: Status: RESOLVED | Noted: 2023-10-13 | Resolved: 2024-03-04

## 2024-03-04 PROBLEM — R47.81 SLURRED SPEECH: Status: RESOLVED | Noted: 2023-09-17 | Resolved: 2024-03-04

## 2024-03-04 PROBLEM — I48.20 CHRONIC ATRIAL FIBRILLATION (MULTI): Status: RESOLVED | Noted: 2023-01-05 | Resolved: 2024-03-04

## 2024-03-04 PROBLEM — M79.675 PAIN IN TOE OF LEFT FOOT: Status: RESOLVED | Noted: 2021-07-30 | Resolved: 2024-03-04

## 2024-03-04 PROCEDURE — 1124F ACP DISCUSS-NO DSCNMKR DOCD: CPT | Performed by: STUDENT IN AN ORGANIZED HEALTH CARE EDUCATION/TRAINING PROGRAM

## 2024-03-04 PROCEDURE — 1170F FXNL STATUS ASSESSED: CPT | Performed by: STUDENT IN AN ORGANIZED HEALTH CARE EDUCATION/TRAINING PROGRAM

## 2024-03-04 PROCEDURE — 1159F MED LIST DOCD IN RCRD: CPT | Performed by: STUDENT IN AN ORGANIZED HEALTH CARE EDUCATION/TRAINING PROGRAM

## 2024-03-04 PROCEDURE — 3075F SYST BP GE 130 - 139MM HG: CPT | Performed by: STUDENT IN AN ORGANIZED HEALTH CARE EDUCATION/TRAINING PROGRAM

## 2024-03-04 PROCEDURE — 3078F DIAST BP <80 MM HG: CPT | Performed by: STUDENT IN AN ORGANIZED HEALTH CARE EDUCATION/TRAINING PROGRAM

## 2024-03-04 PROCEDURE — 1160F RVW MEDS BY RX/DR IN RCRD: CPT | Performed by: STUDENT IN AN ORGANIZED HEALTH CARE EDUCATION/TRAINING PROGRAM

## 2024-03-04 PROCEDURE — 99215 OFFICE O/P EST HI 40 MIN: CPT | Performed by: STUDENT IN AN ORGANIZED HEALTH CARE EDUCATION/TRAINING PROGRAM

## 2024-03-04 PROCEDURE — 1126F AMNT PAIN NOTED NONE PRSNT: CPT | Performed by: STUDENT IN AN ORGANIZED HEALTH CARE EDUCATION/TRAINING PROGRAM

## 2024-03-04 PROCEDURE — 1036F TOBACCO NON-USER: CPT | Performed by: STUDENT IN AN ORGANIZED HEALTH CARE EDUCATION/TRAINING PROGRAM

## 2024-03-04 PROCEDURE — G0439 PPPS, SUBSEQ VISIT: HCPCS | Performed by: STUDENT IN AN ORGANIZED HEALTH CARE EDUCATION/TRAINING PROGRAM

## 2024-03-04 RX ORDER — ASPIRIN 81 MG/1
81 TABLET ORAL DAILY
Start: 2024-03-04

## 2024-03-04 RX ORDER — METOPROLOL SUCCINATE 25 MG/1
25 TABLET, EXTENDED RELEASE ORAL DAILY
Start: 2024-03-04

## 2024-03-04 RX ORDER — FINASTERIDE 5 MG/1
5 TABLET, FILM COATED ORAL DAILY
Start: 2024-03-04

## 2024-03-04 RX ORDER — TORSEMIDE 20 MG/1
30 TABLET ORAL DAILY
Start: 2024-03-04

## 2024-03-04 RX ORDER — DEXTROMETHORPHAN HYDROBROMIDE, GUAIFENESIN 5; 100 MG/5ML; MG/5ML
650 LIQUID ORAL 4 TIMES DAILY PRN
Start: 2024-03-04

## 2024-03-04 RX ORDER — ERGOCALCIFEROL 1.25 MG/1
50000 CAPSULE ORAL
Start: 2024-03-04 | End: 2024-05-06 | Stop reason: SDUPTHER

## 2024-03-04 RX ORDER — SIMVASTATIN 20 MG/1
20 TABLET, FILM COATED ORAL DAILY
Start: 2024-03-04

## 2024-03-04 RX ORDER — AMLODIPINE BESYLATE 10 MG/1
10 TABLET ORAL DAILY
Start: 2024-03-04

## 2024-03-04 ASSESSMENT — ACTIVITIES OF DAILY LIVING (ADL)
TAKING_MEDICATION: INDEPENDENT
MANAGING_FINANCES: INDEPENDENT
GROCERY_SHOPPING: INDEPENDENT
DRESSING: INDEPENDENT
DOING_HOUSEWORK: INDEPENDENT
BATHING: INDEPENDENT

## 2024-03-04 ASSESSMENT — PAIN SCALES - GENERAL: PAINLEVEL: 0-NO PAIN

## 2024-03-04 ASSESSMENT — PATIENT HEALTH QUESTIONNAIRE - PHQ9
1. LITTLE INTEREST OR PLEASURE IN DOING THINGS: NOT AT ALL
2. FEELING DOWN, DEPRESSED OR HOPELESS: NOT AT ALL
SUM OF ALL RESPONSES TO PHQ9 QUESTIONS 1 AND 2: 0

## 2024-03-04 NOTE — PATIENT INSTRUCTIONS
Diagnoses and all orders for this visit:  Annual physical exam  -     Follow Up In Primary Care; Future  Counseling regarding advanced care planning and goals of care  Comments:  Encouraged the patient to confirm that Living Will and Healthcare Power of  (HCPoA) are accurate and up to date.  Encounter for routine laboratory testing  -     CBC and Auto Differential; Future  -     Basic Metabolic Panel; Future  -     Hepatic Function Panel; Future  Encounter for immunization  Coronary artery disease involving native coronary artery of native heart without angina pectoris  -     simvastatin (Zocor) 20 mg tablet; Take 1 tablet (20 mg) by mouth once daily.  -     metoprolol succinate XL (Toprol-XL) 25 mg 24 hr tablet; Take 1 tablet (25 mg) by mouth once daily.  -     aspirin 81 mg EC tablet; Take 1 tablet (81 mg) by mouth once daily.  Benign prostatic hyperplasia without lower urinary tract symptoms  -     finasteride (Proscar) 5 mg tablet; Take 1 tablet (5 mg) by mouth once daily.  Primary osteoarthritis involving multiple joints  -     acetaminophen (Tylenol Arthritis Pain) 650 mg ER tablet; Take 1 tablet (650 mg) by mouth 4 times a day as needed for mild pain (1 - 3), moderate pain (4 - 6) or headaches. Do not crush, chew, or split.  Stage 3b chronic kidney disease (CMS/HCC)  -     torsemide (Demadex) 20 mg tablet; Take 1.5 tablets (30 mg) by mouth once daily.  -     CBC and Auto Differential; Future  -     Basic Metabolic Panel; Future  Chronic diastolic heart failure (CMS/HCC)  -     metoprolol succinate XL (Toprol-XL) 25 mg 24 hr tablet; Take 1 tablet (25 mg) by mouth once daily.  -     torsemide (Demadex) 20 mg tablet; Take 1.5 tablets (30 mg) by mouth once daily.  Paroxysmal atrial fibrillation (CMS/HCC)  -     metoprolol succinate XL (Toprol-XL) 25 mg 24 hr tablet; Take 1 tablet (25 mg) by mouth once daily.  Peripheral vascular disease (CMS/HCC)  -     aspirin 81 mg EC tablet; Take 1 tablet (81 mg) by  mouth once daily.  Age-related physical debility  Essential hypertension  -     metoprolol succinate XL (Toprol-XL) 25 mg 24 hr tablet; Take 1 tablet (25 mg) by mouth once daily.  -     amLODIPine (Norvasc) 10 mg tablet; Take 1 tablet (10 mg) by mouth once daily.  Mixed hyperlipidemia  -     simvastatin (Zocor) 20 mg tablet; Take 1 tablet (20 mg) by mouth once daily.  -     Hepatic Function Panel; Future  Vitamin D deficiency  -     ergocalciferol (Vitamin D-2) 1.25 MG (97546 UT) capsule; Take 1 capsule (50,000 Units) by mouth 1 (one) time per week.  Prediabetes    Discussed routine and/or preventative care with the patient as outlined below:  - Labwork:   - Patient had labwork done for this appointment. Discussed today.   - Patient's kidney function decreased, but stable. Suspect that this is a combination of age-related kidney dysfunction +/- diuretic therapy.  - Stable mild anemia. Age-related. Continue routine labs.  - Will order labwork for the patient to get one week prior to the next appointment.  - Imaging:   - Patient does not appear to be due for routine imaging at this time.  - Immunizations:   - Discussed seasonal immunizations, including the influenza and COVID-19 immunizations.  - Patient does not appear to be due for routine immunizations at this time.   - Significant medication and problem list reconciliation done today.  - Patient noting some age-related physical debility. Has been working with physical therapy and has been making significant progress. Feels much better. Continue physical therapy.  - Patient otherwise feels well. Vitals look great. Do not need to make significant changes at this time.  - I am unsure how much amlodipine the patient is currently taking. Per my documentation from his last visit with me, I see 5mg once/day; however, per [facility], it appears that his cardiologist recently prescribed 10mg once/day. Will update this in the chart for now.

## 2024-03-04 NOTE — PROGRESS NOTES
[facility]: [facility]  MEDICARE WELLNESS EXAM      Moo Rodriguez is a 89 y.o. male that is presenting today for Annual Exam.    Assessment/Plan    Diagnoses and all orders for this visit:  Annual physical exam  -     Follow Up In Primary Care; Future  Counseling regarding advanced care planning and goals of care  Comments:  Encouraged the patient to confirm that Living Will and Healthcare Power of  (HCPoA) are accurate and up to date.  Encounter for routine laboratory testing  -     CBC and Auto Differential; Future  -     Basic Metabolic Panel; Future  -     Hepatic Function Panel; Future  Encounter for immunization  Coronary artery disease involving native coronary artery of native heart without angina pectoris  -     simvastatin (Zocor) 20 mg tablet; Take 1 tablet (20 mg) by mouth once daily.  -     metoprolol succinate XL (Toprol-XL) 25 mg 24 hr tablet; Take 1 tablet (25 mg) by mouth once daily.  -     aspirin 81 mg EC tablet; Take 1 tablet (81 mg) by mouth once daily.  Benign prostatic hyperplasia without lower urinary tract symptoms  -     finasteride (Proscar) 5 mg tablet; Take 1 tablet (5 mg) by mouth once daily.  Primary osteoarthritis involving multiple joints  -     acetaminophen (Tylenol Arthritis Pain) 650 mg ER tablet; Take 1 tablet (650 mg) by mouth 4 times a day as needed for mild pain (1 - 3), moderate pain (4 - 6) or headaches. Do not crush, chew, or split.  Stage 3b chronic kidney disease (CMS/HCC)  -     torsemide (Demadex) 20 mg tablet; Take 1.5 tablets (30 mg) by mouth once daily.  -     CBC and Auto Differential; Future  -     Basic Metabolic Panel; Future  Chronic diastolic heart failure (CMS/HCC)  -     metoprolol succinate XL (Toprol-XL) 25 mg 24 hr tablet; Take 1 tablet (25 mg) by mouth once daily.  -     torsemide (Demadex) 20 mg tablet; Take 1.5 tablets (30 mg) by mouth once daily.  Paroxysmal atrial fibrillation (CMS/HCC)  -     metoprolol succinate XL  (Toprol-XL) 25 mg 24 hr tablet; Take 1 tablet (25 mg) by mouth once daily.  Peripheral vascular disease (CMS/HCC)  -     aspirin 81 mg EC tablet; Take 1 tablet (81 mg) by mouth once daily.  Age-related physical debility  Essential hypertension  -     metoprolol succinate XL (Toprol-XL) 25 mg 24 hr tablet; Take 1 tablet (25 mg) by mouth once daily.  -     amLODIPine (Norvasc) 10 mg tablet; Take 1 tablet (10 mg) by mouth once daily.  Mixed hyperlipidemia  -     simvastatin (Zocor) 20 mg tablet; Take 1 tablet (20 mg) by mouth once daily.  -     Hepatic Function Panel; Future  Vitamin D deficiency  -     ergocalciferol (Vitamin D-2) 1.25 MG (78869 UT) capsule; Take 1 capsule (50,000 Units) by mouth 1 (one) time per week.  Prediabetes    Discussed routine and/or preventative care with the patient as outlined below:  - Labwork:   - Patient had labwork done for this appointment. Discussed today.   - Patient's kidney function decreased, but stable. Suspect that this is a combination of age-related kidney dysfunction +/- diuretic therapy.  - Stable mild anemia. Age-related. Continue routine labs.  - Will order labwork for the patient to get one week prior to the next appointment.  - Imaging:   - Patient does not appear to be due for routine imaging at this time.  - Immunizations:   - Discussed seasonal immunizations, including the influenza and COVID-19 immunizations.  - Patient does not appear to be due for routine immunizations at this time.   - Significant medication and problem list reconciliation done today.  - Patient noting some age-related physical debility. Has been working with physical therapy and has been making significant progress. Feels much better. Continue physical therapy.  - Patient otherwise feels well. Vitals look great. Do not need to make significant changes at this time.  - I am unsure how much amlodipine the patient is currently taking. Per my documentation from his last visit with me, I see 5mg  once/day; however, per [facility], it appears that his cardiologist recently prescribed 10mg once/day. Will update this in the chart for now.     ADVANCED CARE PLANNING  Advanced Care Planning was discussed with patient:  The patient has an active advanced care plan on file. The patient has an active surrogate decision-maker on file.  Encouraged the patient to confirm that Living Will and Healthcare Power of  (HCPoA) are accurate and up to date.  Encouraged the patient to confirm that our office be provided a copy of any documentation in the event that anything changes.    ACTIVITIES OF DAILY LIVING  Basic ADLs:  Bathing: Independent, Dressing: Independent, Toileting: Independent, Transferring: Independent, Continence: Independent, Feeding: Independent.    Instrumental ADLs:  Ability to use phone: Independent, Shopping: Independent, Cooking: Independent, House-keeping: Independent, Laundry: Independent, Transportation: Independent, Medication Management: Independent, Finance Management: Independent.    Subjective   HPI  Review of Systems  Objective   Vitals:    03/04/24 0818   BP: 132/60   Pulse: 76   Temp: 36.4 °C (97.6 °F)   SpO2: 95%      Body mass index is 26.86 kg/m².  Physical Exam  Diagnostic Results   Lab Results   Component Value Date    GLUCOSE 96 02/26/2024    CALCIUM 9.7 02/26/2024     02/26/2024    K 4.3 02/26/2024    CO2 27 02/26/2024     02/26/2024    BUN 38 (H) 02/26/2024    CREATININE 1.70 (H) 02/26/2024     Lab Results   Component Value Date    ALT 9 02/26/2024    AST 15 02/26/2024    ALKPHOS 114 02/26/2024    BILITOT 0.5 02/26/2024     Lab Results   Component Value Date    WBC 6.8 02/26/2024    HGB 12.5 (L) 02/26/2024    HCT 39.7 (L) 02/26/2024    MCV 92 02/26/2024     02/26/2024     Lab Results   Component Value Date    CHOL 111 (L) 02/26/2024    CHOL 129 (L) 02/24/2023    CHOL 123 (L) 02/25/2022     Lab Results   Component Value Date    HDL 58.0 02/26/2024    HDL  "59 02/24/2023    HDL 60 02/25/2022     Lab Results   Component Value Date    LDLCALC 42 (L) 02/26/2024    LDLCALC 55 (L) 02/24/2023    LDLCALC 49 (L) 02/25/2022     Lab Results   Component Value Date    TRIG 56 02/26/2024    TRIG 73 02/24/2023    TRIG 69 02/25/2022     No components found for: \"CHOLHDL\"  Lab Results   Component Value Date    HGBA1C 5.4 02/26/2024     Other labs not included in the list above reviewed either before or during this encounter.    History   Past Medical History:   Diagnosis Date    Atherosclerotic heart disease of native coronary artery without angina pectoris     Coronary artery disease without angina pectoris, unspecified vessel or lesion type, unspecified whether native or transplanted heart    Finger laceration     left index    Hypertension     Personal history of other diseases of the circulatory system     History of hypertension    Personal history of other diseases of the circulatory system     History of atrial fibrillation     Past Surgical History:   Procedure Laterality Date    MR HEAD ANGIO WO IV CONTRAST  5/12/2018    MR HEAD ANGIO WO IV CONTRAST [facility]    OTHER SURGICAL HISTORY  02/17/2021    Rotator cuff repair    OTHER SURGICAL HISTORY  02/17/2021    Hernia repair    OTHER SURGICAL HISTORY  02/17/2021    Tonsillectomy with adenoidectomy    OTHER SURGICAL HISTORY  02/17/2021    Arterial stent placement     Family History   Problem Relation Name Age of Onset    Cancer Mother      Cancer Sibling       Social History     Socioeconomic History    Marital status:      Spouse name: Not on file    Number of children: Not on file    Years of education: Not on file    Highest education level: Not on file   Occupational History    Not on file   Tobacco Use    Smoking status: Former     Types: Cigarettes     Passive exposure: Never    Smokeless tobacco: Former   Vaping Use    Vaping Use: Never used   Substance and Sexual Activity    Alcohol use: Not Currently "    Drug use: Never    Sexual activity: Defer   Other Topics Concern    Not on file   Social History Narrative    Not on file     Social Determinants of Health     Financial Resource Strain: Not on file   Food Insecurity: Not on file   Transportation Needs: Not on file   Physical Activity: Not on file   Stress: Not on file   Social Connections: Not on file   Intimate Partner Violence: Not on file   Housing Stability: Not on file     Allergies   Allergen Reactions    Ace Inhibitors Angioedema     Current Outpatient Medications on File Prior to Visit   Medication Sig Dispense Refill    cholecalciferol (Vitamin D-3) 25 MCG (1000 UT) tablet 1 tablet Orally Once a day      diclofenac (Voltaren) 0.1 % ophthalmic solution INSTILL 1 DROP IN THE RIGHT EYE 4 TIMES EVERY DAY FOR 2 DAYS AS NEEDED FOR PAIN THEN STOP      EPINEPHrine (EpiPen 2-Toño) 0.3 mg/0.3 mL injection syringe as directed Injection      vitamins A,C,E-zinc-copper (PreserVision AREDS) 2,148 mcg-113 mg-45 mg-17.4mg tablet PreserVision AREDS TABS   Refills: 0       Active      [DISCONTINUED] acetaminophen (Tylenol Arthritis Pain) 650 mg ER tablet 1 tablet as needed Orally every 6 hours      [DISCONTINUED] aspirin 81 mg EC tablet 1 tablet Orally Once a day      [DISCONTINUED] ergocalciferol (Vitamin D-2) 1.25 MG (87279 UT) capsule TAKE 1 CAPSULE BY MOUTH ONE TIME PER WEEK FOR 90 DAYS 12 capsule 3    [DISCONTINUED] finasteride (Proscar) 5 mg tablet Take 1 tablet (5 mg) by mouth once daily.      [DISCONTINUED] Lasix 20 mg tablet 1 tablet Orally Once a day for 90 days      [DISCONTINUED] metoprolol succinate XL (Toprol-XL) 25 mg 24 hr tablet Take 1 tablet (25 mg) by mouth once daily.      [DISCONTINUED] simvastatin (Zocor) 20 mg tablet Take 1 tablet (20 mg) by mouth once daily.      [DISCONTINUED] torsemide (Demadex) 20 mg tablet Take 1.5 tablets (30 mg) by mouth once daily.      [DISCONTINUED] amLODIPine (Norvasc) 10 mg tablet Take 1 tablet (10 mg) by mouth once  daily.      [DISCONTINUED] amLODIPine (Norvasc) 5 mg tablet 1 tablet Orally Once a day       No current facility-administered medications on file prior to visit.     Immunization History   Administered Date(s) Administered    Flu vaccine, quadrivalent, high-dose, preservative free, age 65y+ (FLUZONE) 10/05/2020, 10/13/2021, 09/19/2022    Influenza, High Dose Seasonal, Preservative Free 10/08/2016, 10/18/2017, 10/01/2018, 10/21/2019    Influenza, Seasonal, Quadrivalent, Adjuvanted 09/20/2023    Influenza, injectable, quadrivalent 09/20/2011    Influenza, seasonal, injectable 10/25/2011, 10/10/2012, 11/13/2015    Moderna SARS-CoV-2 Vaccination 01/22/2021, 02/19/2021, 10/29/2021, 07/20/2022    Pneumococcal conjugate vaccine, 13-valent (PREVNAR 13) 12/01/2017    Pneumococcal polysaccharide vaccine, 23-valent, age 2 years and older (PNEUMOVAX 23) 12/11/2018    Tdap vaccine, age 7 year and older (BOOSTRIX, ADACEL) 11/08/2019, 09/27/2020, 04/08/2023, 09/29/2023     Patient's medical history was reviewed and updated either before or during this encounter.     Leonard Almonte MD

## 2024-05-06 ENCOUNTER — TELEPHONE (OUTPATIENT)
Dept: PRIMARY CARE | Facility: CLINIC | Age: 89
End: 2024-05-06
Payer: COMMERCIAL

## 2024-05-06 DIAGNOSIS — E55.9 VITAMIN D DEFICIENCY: ICD-10-CM

## 2024-05-06 RX ORDER — ERGOCALCIFEROL 1.25 MG/1
50000 CAPSULE ORAL
Qty: 12 CAPSULE | Refills: 3 | Status: SHIPPED | OUTPATIENT
Start: 2024-05-06

## 2024-05-06 NOTE — TELEPHONE ENCOUNTER
Patient stated that pharmacy told him the RX was cancelled.  Please send below med to [facility] on [address] in [place]    Ergocalciferol (Vitamin D2) 1.25 MG(50,000 UT) Cap  Take 1 cap by mouth 1 per week

## 2024-07-05 ENCOUNTER — APPOINTMENT (OUTPATIENT)
Dept: OTOLARYNGOLOGY | Facility: CLINIC | Age: 89
End: 2024-07-05
Payer: COMMERCIAL

## 2024-07-12 ENCOUNTER — APPOINTMENT (OUTPATIENT)
Dept: OTOLARYNGOLOGY | Facility: CLINIC | Age: 89
End: 2024-07-12
Payer: COMMERCIAL

## 2024-07-12 VITALS — BODY MASS INDEX: 26.96 KG/M2 | WEIGHT: 182 LBS | HEIGHT: 69 IN

## 2024-07-12 DIAGNOSIS — H90.3 SENSORINEURAL HEARING LOSS (SNHL) OF BOTH EARS: ICD-10-CM

## 2024-07-12 DIAGNOSIS — H81.10 BENIGN PAROXYSMAL POSITIONAL VERTIGO, UNSPECIFIED LATERALITY: ICD-10-CM

## 2024-07-12 DIAGNOSIS — H61.23 BILATERAL IMPACTED CERUMEN: Primary | ICD-10-CM

## 2024-07-12 PROCEDURE — 99213 OFFICE O/P EST LOW 20 MIN: CPT | Performed by: OTOLARYNGOLOGY

## 2024-07-12 PROCEDURE — 1159F MED LIST DOCD IN RCRD: CPT | Performed by: OTOLARYNGOLOGY

## 2024-07-12 PROCEDURE — 1036F TOBACCO NON-USER: CPT | Performed by: OTOLARYNGOLOGY

## 2024-07-12 NOTE — PROGRESS NOTES
Chief Complaint   Patient presents with    Cerumen Impaction     LOV: 1/2024 EAR CLEANING      Date of Evaluation: 7/12/2024   HPI  He returns in follow-up for recurrent cerumen impactions.  His ears feel plugged.  Moo Rodriguez is a 89 y.o. male  with a history of hearing loss and recurrent BPPV and recurrent cerumen impactions. His VNG also shows central deficits. He underwent vestibular rehab. MRI of the head was essentially normal. 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        Past Medical History:   Diagnosis Date    Atherosclerotic heart disease of native coronary artery without angina pectoris     Coronary artery disease without angina pectoris, unspecified vessel or lesion type, unspecified whether native or transplanted heart    Finger laceration     left index    Hypertension     Personal history of other diseases of the circulatory system     History of hypertension    Personal history of other diseases of the circulatory system     History of atrial fibrillation      Past Surgical History:   Procedure Laterality Date    MR HEAD ANGIO WO IV CONTRAST  5/12/2018    MR HEAD ANGIO WO IV CONTRAST [facility]    OTHER SURGICAL HISTORY  02/17/2021    Rotator cuff repair    OTHER SURGICAL HISTORY  02/17/2021    Hernia repair    OTHER SURGICAL HISTORY  02/17/2021    Tonsillectomy with adenoidectomy    OTHER SURGICAL HISTORY  02/17/2021    Arterial stent placement          Medications:   Current Outpatient Medications   Medication Instructions    acetaminophen (TYLENOL ARTHRITIS PAIN) 650 mg, oral, 4 times daily PRN, Do not crush, chew, or split.    amLODIPine (NORVASC) 10 mg, oral, Daily    aspirin 81 mg, oral, Daily    cholecalciferol (Vitamin D-3) 25 MCG (1000 UT) tablet 1 tablet Orally Once a day    diclofenac (Voltaren) 0.1 % ophthalmic solution INSTILL 1 DROP IN THE RIGHT EYE 4 TIMES EVERY DAY FOR 2 DAYS AS NEEDED FOR PAIN THEN  "STOP    EPINEPHrine (EpiPen 2-Toño) 0.3 mg/0.3 mL injection syringe as directed Injection    ergocalciferol (VITAMIN D-2) 50,000 Units, oral, Once Weekly    finasteride (PROSCAR) 5 mg, oral, Daily    metoprolol succinate XL (TOPROL-XL) 25 mg, oral, Daily    simvastatin (ZOCOR) 20 mg, oral, Daily    torsemide (DEMADEX) 30 mg, oral, Daily    vitamins A,C,E-zinc-copper (PreserVision AREDS) 2,148 mcg-113 mg-45 mg-17.4mg tablet PreserVision AREDS TABS   Refills: 0       Active        Allergies:  Allergies   Allergen Reactions    Ace Inhibitors Angioedema        Physical Exam:  Last Recorded Vitals  Height 1.753 m (5' 9\"), weight 82.6 kg (182 lb).  []General appearance: Well-developed, well-nourished in no acute distress, conversant with normal voice quality    Head/face: No erythema or edema or facial tenderness, and normal facial nerve function bilaterally    External ear: Clear external auditory canals with normal pinnae  Tube status: N/A  Middle ear: Tympanic membranes intact and mobile, middle ears normal.  Tympanic membrane perforation: N/A  Mastoid bowl: N/A  Hearing: Normal conversational awareness at normal speech thresholds    Nose visualized using: Anterior rhinoscopy  Nasal dorsum: Nontraumatic midline appearance  Septum: Midline, nonobstructing  Inferior turbinates: Normal, pink  Secretions: Dry    Oral cavity and oropharynx: Normal  Teeth: Good condition  Floor of mouth: without lesions  Palate: Normal hard palate, soft palate and uvula  Oropharynx: Clear, no lesions present  Buccal mucosa: Normal without masses or lesions  Lips: Normal    Nasopharynx: Inadequate mirror exam secondary to gag/anatomy    Neck:  Salivary glands: Normal bilateral parotid and submandibular glands by inspection and palpation.  Non-thyroid masses: No palpable masses or significant lymphadenopathy  Trachea: Midline  Thyroid: No thyromegaly or palpable nodules  Temporomandibular joint: Nontender  Cervical range of motion: " Normal    Neurologic exam: Alert and oriented x3, appropriate affect.  Cranial nerves II-XII normal bilaterally  Extraocular movement: Extraocular movement intact, normal gaze alignment        Moo was seen today for cerumen impaction.  Diagnoses and all orders for this visit:  Bilateral impacted cerumen (Primary)  Sensorineural hearing loss (SNHL) of both ears  Benign paroxysmal positional vertigo, unspecified laterality         PLAN  His ears were cleaned today.  Feeling better.  Recheck in 6 months    Diallo Ni MD

## 2024-08-26 ENCOUNTER — HOSPITAL ENCOUNTER (EMERGENCY)
Facility: HOSPITAL | Age: 89
Discharge: HOME | End: 2024-08-26
Payer: COMMERCIAL

## 2024-08-26 VITALS
SYSTOLIC BLOOD PRESSURE: 133 MMHG | HEIGHT: 71 IN | OXYGEN SATURATION: 98 % | BODY MASS INDEX: 24.6 KG/M2 | DIASTOLIC BLOOD PRESSURE: 77 MMHG | TEMPERATURE: 98.1 F | RESPIRATION RATE: 18 BRPM | WEIGHT: 175.71 LBS | HEART RATE: 77 BPM
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
## 2024-08-26 DIAGNOSIS — S51.012A SKIN TEAR OF LEFT ELBOW WITHOUT COMPLICATION, INITIAL ENCOUNTER: ICD-10-CM

## 2024-08-26 PROCEDURE — 99282 EMERGENCY DEPT VISIT SF MDM: CPT

## 2024-08-26 ASSESSMENT — COLUMBIA-SUICIDE SEVERITY RATING SCALE - C-SSRS
6. HAVE YOU EVER DONE ANYTHING, STARTED TO DO ANYTHING, OR PREPARED TO DO ANYTHING TO END YOUR LIFE?: NO
1. IN THE PAST MONTH, HAVE YOU WISHED YOU WERE DEAD OR WISHED YOU COULD GO TO SLEEP AND NOT WAKE UP?: NO
2. HAVE YOU ACTUALLY HAD ANY THOUGHTS OF KILLING YOURSELF?: NO

## 2024-08-26 ASSESSMENT — PAIN DESCRIPTION - LOCATION: LOCATION: ARM

## 2024-08-26 ASSESSMENT — PAIN DESCRIPTION - PAIN TYPE: TYPE: ACUTE PAIN

## 2024-08-26 ASSESSMENT — PAIN - FUNCTIONAL ASSESSMENT: PAIN_FUNCTIONAL_ASSESSMENT: 0-10

## 2024-08-26 ASSESSMENT — PAIN DESCRIPTION - ORIENTATION: ORIENTATION: LEFT

## 2024-08-26 ASSESSMENT — PAIN DESCRIPTION - PROGRESSION: CLINICAL_PROGRESSION: NOT CHANGED

## 2024-08-26 ASSESSMENT — PAIN DESCRIPTION - DESCRIPTORS: DESCRIPTORS: SORE

## 2024-08-26 ASSESSMENT — PAIN SCALES - GENERAL: PAINLEVEL_OUTOF10: 2

## 2024-08-26 ASSESSMENT — PAIN DESCRIPTION - ONSET: ONSET: SUDDEN

## 2024-08-26 ASSESSMENT — PAIN DESCRIPTION - FREQUENCY: FREQUENCY: INTERMITTENT

## 2024-08-26 NOTE — ED PROVIDER NOTES
HPI   Chief Complaint   Patient presents with    Fall     I tripped in the garden and fell I have a skin tare on my lt arm  the pain is sore        HPI  Patient is a 89-year-old male who presents to ED for skin tear on the left elbow.  Patient states he was working in the garden earlier this morning when he lost his balance and fell over causing a skin tear.  He states that he resumed working in the garden for several more hours until reporting to the ED at the behest of his wife.  Patient denies any pain in the elbow.  He denies any head injury, loss of consciousness, blood thinner use.  He denies any other injury sustained in the fall.  Denies chest pain, abdominal pain, pelvic pain.  Denies any pain in the extremities.  Patient is ambulating well independently without assistance.  He has no other obvious signs of external injury.      Patient History   Past Medical History:   Diagnosis Date    Atherosclerotic heart disease of native coronary artery without angina pectoris     Coronary artery disease without angina pectoris, unspecified vessel or lesion type, unspecified whether native or transplanted heart    Finger laceration     left index    Hypertension     Personal history of other diseases of the circulatory system     History of hypertension    Personal history of other diseases of the circulatory system     History of atrial fibrillation     Past Surgical History:   Procedure Laterality Date    MR HEAD ANGIO WO IV CONTRAST  5/12/2018    MR HEAD ANGIO WO IV CONTRAST [facility]    OTHER SURGICAL HISTORY  02/17/2021    Rotator cuff repair    OTHER SURGICAL HISTORY  02/17/2021    Hernia repair    OTHER SURGICAL HISTORY  02/17/2021    Tonsillectomy with adenoidectomy    OTHER SURGICAL HISTORY  02/17/2021    Arterial stent placement     Family History   Problem Relation Name Age of Onset    Cancer Mother      Cancer Sibling       Social History     Tobacco Use    Smoking status: Former     Types:  Cigarettes     Passive exposure: Never    Smokeless tobacco: Former   Vaping Use    Vaping status: Never Used   Substance Use Topics    Alcohol use: Not Currently    Drug use: Never       Physical Exam   ED Triage Vitals [08/26/24 1454]   Temperature Heart Rate Respirations BP   36.7 °C (98.1 °F) 77 18 133/77      Pulse Ox Temp Source Heart Rate Source Patient Position   98 % Oral Monitor Sitting      BP Location FiO2 (%)     Left arm --       Physical Exam  Vitals reviewed.   Constitutional:       General: He is not in acute distress.     Appearance: Normal appearance. He is not ill-appearing.   HENT:      Head: Normocephalic and atraumatic.   Eyes:      Extraocular Movements: Extraocular movements intact.   Cardiovascular:      Rate and Rhythm: Normal rate.   Pulmonary:      Effort: Pulmonary effort is normal.   Abdominal:      General: Abdomen is flat.   Musculoskeletal:         General: Signs of injury (Small skin tear of the left elbow) present. Normal range of motion.      Cervical back: Normal range of motion and neck supple.   Skin:     General: Skin is warm and dry.   Neurological:      General: No focal deficit present.      Mental Status: He is alert and oriented to person, place, and time.   Psychiatric:         Mood and Affect: Mood normal.         Behavior: Behavior normal.           ED Course & MDM   Diagnoses as of 08/26/24 1626   Fall, initial encounter   Skin tear of left elbow without complication, initial encounter                 No data recorded     Dixon Coma Scale Score: 15 (08/26/24 1504 : Nadine Wesley RN)                           Medical Decision Making  Parts of this chart have been completed using voice recognition software. Please excuse any errors of transcription.  My thought process and reason for plan has been formulated from the time that I saw the patient until the time of disposition and is not specific to one specific moment during their visit and furthermore my MDM  encompasses this entire chart and not only this text box.    HPI:   Detailed above.    Exam:   A medically appropriate exam performed, outlined above, given the known history and presentation.    History obtained from:   Patient    EKG/Cardiac monitor:     Social Determinants of Health considered during this visit:       Medications given during visit:  Medications - No data to display     Diagnostic/tests:  Labs Reviewed - No data to display   No orders to display          MDM Summary:  Patient has no pain in the elbow, has full range of motion, no obvious deformity.  No imaging is indicated at this time.  Wound was cleansed with normal saline and wrapped in sterile gauze dressing.    We have discussed the diagnosis and risks, and we agree with discharging home to follow-up with appropriate physician as directed. We also discussed returning to the Emergency Department immediately if new or worsening symptoms occur. We have discussed the symptoms which are most concerning that necessitate immediate return. Pt symptoms have been well controlled here and the patient is safe for discharge with appropriate outpatient follow up. The patient has verbalized understanding to return to ER without delay for new or worsening pains or for any other symptoms or concerns. I utilized the discharge clinical management tool provided [employer] to help estimate risk of negative outcome for this patient.        Procedure  Procedures     Tarun Sorensen PA-C  08/26/24 9692

## 2024-09-04 ENCOUNTER — APPOINTMENT (OUTPATIENT)
Dept: RADIOLOGY | Facility: HOSPITAL | Age: 89
End: 2024-09-04
Payer: COMMERCIAL

## 2024-09-04 ENCOUNTER — HOSPITAL ENCOUNTER (EMERGENCY)
Facility: HOSPITAL | Age: 89
Discharge: HOME | End: 2024-09-04
Attending: EMERGENCY MEDICINE
Payer: COMMERCIAL

## 2024-09-04 VITALS
SYSTOLIC BLOOD PRESSURE: 159 MMHG | DIASTOLIC BLOOD PRESSURE: 71 MMHG | WEIGHT: 170 LBS | HEIGHT: 71 IN | HEART RATE: 64 BPM | OXYGEN SATURATION: 97 % | BODY MASS INDEX: 23.8 KG/M2 | TEMPERATURE: 98.2 F | RESPIRATION RATE: 18 BRPM

## 2024-09-04 DIAGNOSIS — M47.814 OSTEOARTHRITIS OF THORACIC SPINE, UNSPECIFIED SPINAL OSTEOARTHRITIS COMPLICATION STATUS: ICD-10-CM

## 2024-09-04 DIAGNOSIS — M47.816 ARTHRITIS, LUMBAR SPINE: ICD-10-CM
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
## 2024-09-04 DIAGNOSIS — M54.50 ACUTE BILATERAL LOW BACK PAIN WITHOUT SCIATICA: ICD-10-CM

## 2024-09-04 LAB
INR PPP: 1.2 (ref 0.9–1.2)
PROTHROMBIN TIME: 12.1 SECONDS (ref 9.3–12.7)

## 2024-09-04 PROCEDURE — 72100 X-RAY EXAM L-S SPINE 2/3 VWS: CPT

## 2024-09-04 PROCEDURE — 72125 CT NECK SPINE W/O DYE: CPT | Performed by: RADIOLOGY

## 2024-09-04 PROCEDURE — 70450 CT HEAD/BRAIN W/O DYE: CPT

## 2024-09-04 PROCEDURE — 72072 X-RAY EXAM THORAC SPINE 3VWS: CPT

## 2024-09-04 PROCEDURE — 90471 IMMUNIZATION ADMIN: CPT | Performed by: PHYSICIAN ASSISTANT

## 2024-09-04 PROCEDURE — 90715 TDAP VACCINE 7 YRS/> IM: CPT | Performed by: PHYSICIAN ASSISTANT

## 2024-09-04 PROCEDURE — 72100 X-RAY EXAM L-S SPINE 2/3 VWS: CPT | Performed by: RADIOLOGY

## 2024-09-04 PROCEDURE — 72125 CT NECK SPINE W/O DYE: CPT

## 2024-09-04 PROCEDURE — 70450 CT HEAD/BRAIN W/O DYE: CPT | Performed by: RADIOLOGY

## 2024-09-04 PROCEDURE — 2500000004 HC RX 250 GENERAL PHARMACY W/ HCPCS (ALT 636 FOR OP/ED): Performed by: PHYSICIAN ASSISTANT

## 2024-09-04 PROCEDURE — 99285 EMERGENCY DEPT VISIT HI MDM: CPT | Mod: 25

## 2024-09-04 PROCEDURE — 36415 COLL VENOUS BLD VENIPUNCTURE: CPT | Performed by: EMERGENCY MEDICINE

## 2024-09-04 PROCEDURE — 85610 PROTHROMBIN TIME: CPT | Performed by: EMERGENCY MEDICINE

## 2024-09-04 PROCEDURE — 71046 X-RAY EXAM CHEST 2 VIEWS: CPT

## 2024-09-04 RX ORDER — ACETAMINOPHEN 325 MG/1
975 TABLET ORAL ONCE
Status: COMPLETED | OUTPATIENT
Start: 2024-09-04 | End: 2024-09-04

## 2024-09-04 ASSESSMENT — PAIN - FUNCTIONAL ASSESSMENT: PAIN_FUNCTIONAL_ASSESSMENT: 0-10

## 2024-09-04 ASSESSMENT — PAIN SCALES - GENERAL: PAINLEVEL_OUTOF10: 5 - MODERATE PAIN

## 2024-09-04 NOTE — PROGRESS NOTES
Pharmacy Medication History Review    Moo Rodriguez is a 89 y.o. male admitted for Fall. Pharmacy reviewed the patient's [id number] medications and allergies for accuracy.    The list below reflectives the updated PTA list. Please review each medication in order reconciliation for additional clarification and justification.  Prior to Admission Medications   Prescriptions Last Dose Informant Patient Reported? Taking?   EPINEPHrine (EpiPen 2-Toño) 0.3 mg/0.3 mL injection syringe   Yes No   Sig: as directed Injection   acetaminophen (Tylenol Arthritis Pain) 650 mg ER tablet 9/4/2024 at am  No Yes   Sig: Take 1 tablet (650 mg) by mouth 4 times a day as needed for mild pain (1 - 3), moderate pain (4 - 6) or headaches. Do not crush, chew, or split.   amLODIPine (Norvasc) 10 mg tablet 9/4/2024 at am  No Yes   Sig: Take 1 tablet (10 mg) by mouth once daily.   aspirin 81 mg EC tablet 9/3/2024 at pm  No Yes   Sig: Take 1 tablet (81 mg) by mouth once daily.   cholecalciferol (Vitamin D-3) 25 MCG (1000 UT) tablet 9/4/2024 at am  Yes Yes   Sig: Take 1 tablet (1,000 Units) by mouth once daily.   diclofenac (Voltaren) 0.1 % ophthalmic solution Past Week  Yes Yes   Sig: INSTILL 1 DROP IN THE RIGHT EYE 4 TIMES EVERY DAY FOR 2 DAYS AS NEEDED FOR PAIN THEN STOP   ergocalciferol (Vitamin D-2) 1.25 MG (72635 UT) capsule Past Week  No Yes   Sig: Take 1 capsule (50,000 Units) by mouth 1 (one) time per week.   finasteride (Proscar) 5 mg tablet 9/4/2024 at am  No Yes   Sig: Take 1 tablet (5 mg) by mouth once daily.   metoprolol succinate XL (Toprol-XL) 25 mg 24 hr tablet 9/4/2024 at am  No Yes   Sig: Take 1 tablet (25 mg) by mouth once daily.   simvastatin (Zocor) 20 mg tablet 9/3/2024 at pm  No Yes   Sig: Take 1 tablet (20 mg) by mouth once daily.   torsemide (Demadex) 20 mg tablet Past Week  No Yes   Sig: Take 1.5 tablets (30 mg) by mouth once daily.   vitamins A,C,E-zinc-copper (PreserVision AREDS) 2,148 mcg-113 mg-45 mg-17.4mg  tablet 9/4/2024 at am  Yes Yes   Sig: Take 1 tablet by mouth once daily.      Facility-Administered Medications: None          The list below reflectives the updated allergy list. Please review each documented allergy for additional clarification and justification.  Allergies  Reviewed by Idalia Sanchez CPhT on 9/4/2024        Severity Reactions Comments    Ace Inhibitors High Angioedema             Below are additional concerns with the patient's PTA list.  - Pt stated he is taking Warfarin and has been for 20 years, per his insurance dispense history he has never filled the prescription. I then called his dispensing pharmacy to verify if they have ever received a prescription for the medication, Jie Goodwin at [facility], stated there is nothing on record for the prescription ever being in there system or filled. - I then called the Doctor's office of Cardiologist Brianda Anderson, who he stated prescribed Warfarin to him and they verified he has never been prescribed the medication.   Pt's spouse is prescribed Warfarin and stated that he has told her he is also on the medication and she believes he is confused and was accidentally taking her medication. At this point in time Pt's care team in the emergency room is updated, pt is not prescribed Warfarin.    IDALIA SANCHEZ CPhT

## 2024-09-04 NOTE — DISCHARGE INSTRUCTIONS
Please take over-the-counter Tylenol as needed for pain in your back.  Your x-rays of your spine showed arthritis but no fracture.     Please follow-up with your primary care doctor.  If you begin to have worsening pain, headache, visual disturbances, difficulty walking secondary to pain, please return to the emergency department

## 2024-09-04 NOTE — PROGRESS NOTES
Attestation/Supervisory note for JIN Guillen      The patient is a 89-year-old male presenting to the emergency department for evaluation of a head injury and back pain after he tripped and fell yesterday.  He states that he and his wife are in the process of moving.  He states he was carrying some boxes down the steps.  He states that his he came down the last step he stepped out too far and he lost his balance and he fell forward.  He did hit his head.  He states he did not lose consciousness.  He denies any headache or visual changes.  No neck pain.  He does have some thoracic back pain and left-sided rib pain from the fall.  He denies any other injury or trauma.  He denies any chest pain other than the rib pain.  No shortness of breath.  No palpitations.  No abdominal pain.  No nausea or vomiting.  No diarrhea or constipation.  No urinary complaints.  He states that he does take a blood thinner daily but does not know which one he is on.  His wife was contacted and reports that he does take Coumadin daily.  The pharmacy that dispenses his medications was contacted and states that he is not on any blood thinners..  All pertinent positives and negatives are recorded above.  All other systems reviewed and otherwise negative.  Vital signs with mild hypertension but otherwise within normal limits.  Physical exam with a well-nourished well-developed male in no acute distress.  HEENT exam with dry mucous membranes but otherwise unremarkable.  He does not have any evidence of airway compromise or respiratory distress.  Abdominal exam is benign.  He does not have any gross motor, neurologic or vascular deficits on exam.  Pulses are equal bilaterally.  No visible or palpable bony deformity.      Oral acetaminophen ordered for pain      Diagnostic labs with an INR of 1.2      XR lumbar spine 2-3 views   Final Result   No fracture of the lumbosacral spine is seen. However, there is   slight levoscoliosis and degenerative  disc disease at L1-2, L2-3, and   L4-5 levels with extensive and severe lumbar spondylosis. There is   minimal spondylolisthesis of L4 on L5. Findings may indicate DISH.        Signed by: Sharifa Collins 9/4/2024 12:32 PM   Dictation workstation:   [id number]      XR chest 2 views   Final Result   1.  No evidence of acute cardiopulmonary process.   2. Multilevel chronic and degenerative changes of the thoracic spine   without acute osseous abnormality.             Signed by: Prashanth Riggs 9/4/2024 12:28 PM   Dictation workstation:   [id number]      XR thoracic spine 3 views   Final Result   1.  No evidence of acute cardiopulmonary process.   2. Multilevel chronic and degenerative changes of the thoracic spine   without acute osseous abnormality.             Signed by: Prashanth Riggs 9/4/2024 12:28 PM   Dictation workstation:   [id number]      CT head wo IV contrast   Final Result   No evidence of acute cortical infarct or intracranial hemorrhage.        Chronic changes as described.        MACRO:   None        Signed by: Jesus Alberto Meyers 9/4/2024 11:58 AM   Dictation workstation:   [id number]      CT cervical spine wo IV contrast   Final Result   No evidence for an acute fracture or subluxation of the cervical   spine. Multilevel discogenic degenerative changes.        MACRO:   None        Signed by: Jesus Alberto Meyers 9/4/2024 12:13 PM   Dictation workstation:   [id number]           The patient does not have any visible or palpable bony deformities on exam.  He does not have any evidence of airway compromise or respiratory distress.  He does not have any gross motor, neurologic or vascular deficits on exam.  He was able to complete a trial of ambulation without difficulty.  CT head without evidence of intracranial hemorrhage, mass effect and/or skull fracture.  CT C-spine without evidence of fracture or dislocation.  Chest x-ray without evidence of fracture or pneumothorax.  X-rays of the lumbar and thoracic spine showed no  evidence of acute fracture or dislocation.      The patient was released in good condition.  He was instructed to follow-up with his primary care physician within 1 to 2 days for further management of his current symptoms.  He will return to the emergency department sooner with worsening of symptoms or onset of new symptoms.    Impression/diagnosis:  Fall from standing height, initial encounter  Closed head injury  Thoracic back pain  Left-sided rib pain/contusion  Hypertension, unspecified      I personally saw the patient and made/approve the management plan and take responsibility for the patient management.      I independently interpreted the following study (S) diagnostic labs      I personally discussed the patient's management with the patient      I reviewed the results of the diagnostic labs and diagnostic imaging.  Formal radiology read was completed by the radiologist.      Alma Stockton MD

## 2024-09-04 NOTE — ED PROVIDER NOTES
HPI   Chief Complaint   Patient presents with    Fall     Pt bib ems for fall around 5pm last night, fell forwards on last step, did not hit head, reports he is on blood thinners, also complaining of lower back pain by the waist and some left rib pain       Is a 89-year-old male presenting to the emerged department for mechanical fall.  Patient states that yesterday around 5 PM he was carrying a box down the stairs.  He states that at the last step he missed the step and fell forward with the box.  He does not know if he hit his head or not.  He did not lose consciousness.  He reports that he is on a blood thinner but review of his medications does not do that he is on a blood thinner.  Patient states that he woke up this morning with mid to low back pain prompting him to come to the emergency department.  He denies any headache, visual disturbances or neck pain.  No chest pain or abdominal pain.  He has been walking without difficulty.  He denies any pain in his upper or lower extremities bilaterally.        Please see HPI for pertinent positive and negative ROS.       Patient History   Past Medical History:   Diagnosis Date    Atherosclerotic heart disease of native coronary artery without angina pectoris     Coronary artery disease without angina pectoris, unspecified vessel or lesion type, unspecified whether native or transplanted heart    Finger laceration     left index    Hypertension     Personal history of other diseases of the circulatory system     History of hypertension    Personal history of other diseases of the circulatory system     History of atrial fibrillation     Past Surgical History:   Procedure Laterality Date    MR HEAD ANGIO WO IV CONTRAST  5/12/2018    MR HEAD ANGIO WO IV CONTRAST [facility]    OTHER SURGICAL HISTORY  02/17/2021    Rotator cuff repair    OTHER SURGICAL HISTORY  02/17/2021    Hernia repair    OTHER SURGICAL HISTORY  02/17/2021    Tonsillectomy with adenoidectomy     OTHER SURGICAL HISTORY  02/17/2021    Arterial stent placement     Family History   Problem Relation Name Age of Onset    Cancer Mother      Cancer Sibling       Social History     Tobacco Use    Smoking status: Former     Types: Cigarettes     Passive exposure: Never    Smokeless tobacco: Former   Vaping Use    Vaping status: Never Used   Substance Use Topics    Alcohol use: Not Currently    Drug use: Never       Physical Exam   ED Triage Vitals [09/04/24 1059]   Temperature Heart Rate Respirations BP   36.8 °C (98.2 °F) 64 18 159/71      Pulse Ox Temp Source Heart Rate Source Patient Position   97 % Temporal Monitor --      BP Location FiO2 (%)     -- --       Physical Exam  GENERAL APPEARANCE: Awake and alert. No acute respiratory distress.   VITAL SIGNS: As per the nurses' triage record.  HEENT: Normocephalic, atraumatic. Extraocular muscles are intact. Conjunctiva are pink. Negative scleral icterus. Mucous membranes are moist. Tongue in the midline. Oropharynx clear, uvula midline.   NECK: Soft, nontender and supple, full gross ROM, no meningeal signs.  CHEST: Tenderness to palpation over the left lateral inferior rib no crepitus to palpation.. Clear to auscultation bilaterally. No rales, rhonchi, or wheezing. Symmetric rise and fall of chest wall.   HEART: Clear S1 and S2. Regular rate and rhythm. Strong and equal pulses in the extremities.  ABDOMEN: Soft, nontender, nondistended, positive bowel sounds, no palpable masses.  MUSCULOSKELETAL: The calves are nontender to palpation. Full gross active range of motion of upper and lower extremities bilaterally.  No tenderness palpation over the long bones of upper or lower extremities bilaterally.  Patient does have tenderness palpation over the thoracic and lumbar spinous processes.  No bony step-offs appreciated.. Ambulating on own with no acute difficulties  NEUROLOGICAL: Awake, alert and oriented x 3. Motor power intact in the upper and lower extremities.  Sensation is intact to light touch in the upper and lower extremities. Patient answering questions appropriately.  Cranial nerves II through XII grossly intact bilaterally.  IMMUNOLOGICAL: No lymphatic streaking noted  DERMATOLOGIC: Warm and dry without petechiae, rashes, or ecchymosis noted on visible skin.  Superficial abrasion over the dorsal aspect of the right hand.  PYSCH: Cooperative with appropriate mood and affect.    ED Course & MDM   Diagnoses as of 09/04/24 1257   Fall, initial encounter   Arthritis, lumbar spine   Osteoarthritis of thoracic spine, unspecified spinal osteoarthritis complication status   Acute bilateral low back pain without sciatica                 No data recorded     [place] Coma Scale Score: 15 (09/04/24 1056 : Tala Neri RN)                           Medical Decision Making  Parts of this chart have been completed using voice recognition software. Please excuse any errors of transcription.  My thought process and reason for plan has been formulated from the time that I saw the patient until the time of disposition and is not specific to one specific moment during their visit and furthermore my MDM encompasses this entire chart and not only this text box.      HPI: Detailed above.    Exam: A medically appropriate exam performed, outlined above, given the known history and presentation.    History obtained from: Patient    Medications given during visit:  Medications   acetaminophen (Tylenol) tablet 975 mg (975 mg oral Given 9/4/24 1102)   diphth,pertus(acell),tetanus (BoostRIX) 2.5-8-5 Lf-mcg-Lf/0.5mL vaccine 0.5 mL (0.5 mL intramuscular Given 9/4/24 1121)        Diagnostic/tests  Labs Reviewed   PROTIME-INR - Normal       Result Value    Protime 12.1      INR 1.2      Narrative:     INR Therapeutic Range: 2.0-3.5      XR lumbar spine 2-3 views   Final Result   No fracture of the lumbosacral spine is seen. However, there is   slight levoscoliosis and degenerative disc disease at  L1-2, L2-3, and   L4-5 levels with extensive and severe lumbar spondylosis. There is   minimal spondylolisthesis of L4 on L5. Findings may indicate DISH.        Signed by: Sharifa Collins 9/4/2024 12:32 PM   Dictation workstation:   [id number]      XR chest 2 views   Final Result   1.  No evidence of acute cardiopulmonary process.   2. Multilevel chronic and degenerative changes of the thoracic spine   without acute osseous abnormality.             Signed by: Prashanth Riggs 9/4/2024 12:28 PM   Dictation workstation:   [id number]      XR thoracic spine 3 views   Final Result   1.  No evidence of acute cardiopulmonary process.   2. Multilevel chronic and degenerative changes of the thoracic spine   without acute osseous abnormality.             Signed by: Prashanth Riggs 9/4/2024 12:28 PM   Dictation workstation:   [id number]      CT head wo IV contrast   Final Result   No evidence of acute cortical infarct or intracranial hemorrhage.        Chronic changes as described.        MACRO:   None        Signed by: Jesus Alberto Meyers 9/4/2024 11:58 AM   Dictation workstation:   [id number]      CT cervical spine wo IV contrast   Final Result   No evidence for an acute fracture or subluxation of the cervical   spine. Multilevel discogenic degenerative changes.        MACRO:   None        Signed by: Jesus Alberto Meyers 9/4/2024 12:13 PM   Dictation workstation:   [id number]           Considerations/further MDM:  Patient was seen in conjucntion with my supervising physician,  Dr. Stockton. Please refer to her note.    Blood pressure 159/71, temperature 98.2 °F, heart rate 64 bpm, respirations 18, 97% on room air.    This is a 89-year-old male presenting to the [facility] part for mechanical fall.  Patient reportedly stated he is on a blood thinner, however, during review of his medication list, there is no blood thinner listed.  A PT/INR was obtained out of caution.  INR 1.2.  Patient was having pain in his mid to lower back.  Differential diagnosis  includes was not limited to head injury versus cervical spine injury versus rib fracture versus tibial bone injury.  CT scan of head and cervical spine without IV contrast shows no acute findings.  X-ray of the lumbar and thoracic spine show degenerative changes but no acute fracture or traumatic malalignment.  Chest x-ray shows no acute cardiopulmonary process, no pneumothorax.  Patient was given tetanus booster for his abrasion on the right hand and treated with Tylenol.  He was discharged with instructions to use over-the-counter Tylenol as needed for pain.  He was educated to follow-up with his primary care doctor and was given strict return precautions.  Patient verbalized understanding felt comfortable discharge plan home.  He was discharged in stable condition.    Procedure  Procedures     Ana Luisa Guillen PA-C  09/04/24 1257

## 2024-09-13 ENCOUNTER — OFFICE VISIT (OUTPATIENT)
Dept: PRIMARY CARE | Facility: CLINIC | Age: 89
End: 2024-09-13
Payer: COMMERCIAL

## 2024-09-13 VITALS
DIASTOLIC BLOOD PRESSURE: 64 MMHG | TEMPERATURE: 97.1 F | WEIGHT: 176 LBS | SYSTOLIC BLOOD PRESSURE: 124 MMHG | OXYGEN SATURATION: 98 % | HEART RATE: 52 BPM | HEIGHT: 71 IN | BODY MASS INDEX: 24.64 KG/M2

## 2024-09-13 DIAGNOSIS — I50.32 CHRONIC DIASTOLIC HEART FAILURE (MULTI): ICD-10-CM

## 2024-09-13 DIAGNOSIS — M15.9 PRIMARY OSTEOARTHRITIS INVOLVING MULTIPLE JOINTS: ICD-10-CM

## 2024-09-13 DIAGNOSIS — E55.9 VITAMIN D DEFICIENCY: ICD-10-CM

## 2024-09-13 DIAGNOSIS — Z23 ENCOUNTER FOR IMMUNIZATION: ICD-10-CM

## 2024-09-13 DIAGNOSIS — Z01.89 ENCOUNTER FOR ROUTINE LABORATORY TESTING: ICD-10-CM

## 2024-09-13 DIAGNOSIS — I10 PRIMARY HYPERTENSION: ICD-10-CM

## 2024-09-13 DIAGNOSIS — R73.03 PREDIABETES: ICD-10-CM

## 2024-09-13 DIAGNOSIS — I48.0 PAROXYSMAL ATRIAL FIBRILLATION (MULTI): ICD-10-CM

## 2024-09-13 DIAGNOSIS — E78.2 MIXED HYPERLIPIDEMIA: ICD-10-CM

## 2024-09-13 DIAGNOSIS — I25.10 CORONARY ARTERY DISEASE INVOLVING NATIVE CORONARY ARTERY OF NATIVE HEART WITHOUT ANGINA PECTORIS: Primary | ICD-10-CM

## 2024-09-13 DIAGNOSIS — R54 AGE-RELATED PHYSICAL DEBILITY: ICD-10-CM

## 2024-09-13 DIAGNOSIS — N18.32 STAGE 3B CHRONIC KIDNEY DISEASE (MULTI): ICD-10-CM

## 2024-09-13 DIAGNOSIS — I73.9 PVD (PERIPHERAL VASCULAR DISEASE) (CMS-HCC): ICD-10-CM

## 2024-09-13 DIAGNOSIS — Z00.00 ANNUAL PHYSICAL EXAM: ICD-10-CM

## 2024-09-13 DIAGNOSIS — N40.0 BENIGN PROSTATIC HYPERPLASIA WITHOUT LOWER URINARY TRACT SYMPTOMS: ICD-10-CM

## 2024-09-13 PROBLEM — H90.5 SNHL (SENSORINEURAL HEARING LOSS): Status: ACTIVE | Noted: 2023-09-17

## 2024-09-13 PROBLEM — N18.9 CHRONIC RENAL FAILURE SYNDROME: Status: RESOLVED | Noted: 2024-09-13 | Resolved: 2024-09-13

## 2024-09-13 PROBLEM — M19.90 OA (OSTEOARTHRITIS): Status: ACTIVE | Noted: 2023-09-17

## 2024-09-13 PROBLEM — M25.069 HEMARTHROSIS OF KNEE: Status: RESOLVED | Noted: 2024-09-13 | Resolved: 2024-09-13

## 2024-09-13 PROBLEM — R41.3 MEMORY IMPAIRMENT: Status: RESOLVED | Noted: 2024-09-13 | Resolved: 2024-09-13

## 2024-09-13 PROBLEM — R42 LIGHTHEADEDNESS: Status: RESOLVED | Noted: 2024-09-13 | Resolved: 2024-09-13

## 2024-09-13 PROBLEM — R26.9 ABNORMAL GAIT: Status: RESOLVED | Noted: 2024-09-13 | Resolved: 2024-09-13

## 2024-09-13 PROBLEM — T14.8XXA HEMATOMA: Status: RESOLVED | Noted: 2024-09-13 | Resolved: 2024-09-13

## 2024-09-13 PROBLEM — R47.81 SLURRED SPEECH: Status: RESOLVED | Noted: 2024-09-13 | Resolved: 2024-09-13

## 2024-09-13 PROBLEM — E78.5 HLD (HYPERLIPIDEMIA): Status: ACTIVE | Noted: 2023-09-17

## 2024-09-13 PROCEDURE — 90662 IIV NO PRSV INCREASED AG IM: CPT | Performed by: STUDENT IN AN ORGANIZED HEALTH CARE EDUCATION/TRAINING PROGRAM

## 2024-09-13 PROCEDURE — 99214 OFFICE O/P EST MOD 30 MIN: CPT | Performed by: STUDENT IN AN ORGANIZED HEALTH CARE EDUCATION/TRAINING PROGRAM

## 2024-09-13 ASSESSMENT — ENCOUNTER SYMPTOMS
DEPRESSION: 0
OCCASIONAL FEELINGS OF UNSTEADINESS: 0
LOSS OF SENSATION IN FEET: 0

## 2024-09-13 ASSESSMENT — PAIN SCALES - GENERAL: PAINLEVEL: 0-NO PAIN

## 2024-09-13 NOTE — PATIENT INSTRUCTIONS
- Significant medication and problem list reconciliation done today.  - Patient had labwork done for this appointment. Discussed today. Everything looked great.   (Labwork done through [facility] and can be seen in CareEverywhere)  - Will order labwork for the patient's next appointment. Encouraged the patient to get this labwork done one week prior to the next appointment.  - Blood pressure at goal today.  - Encouraged continued diet, exercise, and lifestyle modification.  - Patient has just seen his echocardiogram done by cardiologist. Everything looked good.  - Overall, the patient feels well. Denies any symptoms or concerns at this time.

## 2024-09-13 NOTE — PROGRESS NOTES
[facility]: [facility]  PROGRESS NOTE      Moo Rodriguez is a 89 y.o. male that is presenting today for Follow-up.    Assessment/Plan   - Significant medication and problem list reconciliation done today.  - Patient had labwork done for this appointment. Discussed today. Everything looked great.   (Labwork done through [facility] and can be seen in CareEverywhere)  - Will order labwork for the patient's next appointment. Encouraged the patient to get this labwork done one week prior to the next appointment.  - Blood pressure at goal today.  - Encouraged continued diet, exercise, and lifestyle modification.  - Patient has just seen his echocardiogram done by cardiologist. Everything looked good.  - Overall, the patient feels well. Denies any symptoms or concerns at this time.    Diagnoses and all orders for this visit:  Coronary artery disease involving native coronary artery of native heart without angina pectoris  Benign prostatic hyperplasia without lower urinary tract symptoms  Primary osteoarthritis involving multiple joints  PVD (peripheral vascular disease) (CMS-[facility])  Stage 3b chronic kidney disease (Multi)  -     Albumin-Creatinine Ratio, Urine Random; Future  Chronic diastolic heart failure (Multi)  Paroxysmal atrial fibrillation (Multi)  -     Magnesium; Future  -     Phosphorus; Future  Age-related physical debility  Mixed hyperlipidemia  -     Hepatic Function Panel; Future  -     Lipid Panel; Future  Primary hypertension  -     CBC and Auto Differential; Future  -     Basic Metabolic Panel; Future  -     TSH with reflex to Free T4 if abnormal; Future  Vitamin D deficiency  -     Vitamin D 25-Hydroxy,Total (for eval of Vitamin D levels); Future  Prediabetes  -     Hemoglobin A1C; Future  Encounter for routine laboratory testing  Encounter for immunization  -     Flu vaccine, trivalent, preservative free, HIGH-DOSE, age 65y+ (Fluzone)  Annual physical exam  -     Follow Up In Primary  Care  -     Follow Up In Primary Care; Future    Current Outpatient Medications   Medication Instructions    acetaminophen (TYLENOL ARTHRITIS PAIN) 650 mg, oral, 4 times daily PRN, Do not crush, chew, or split.    amLODIPine (NORVASC) 10 mg, oral, Daily    aspirin 81 mg, oral, Daily    cholecalciferol (Vitamin D-3) 25 MCG (1000 UT) tablet Take 1 tablet (1,000 Units) by mouth once daily.    diclofenac (Voltaren) 0.1 % ophthalmic solution INSTILL 1 DROP IN THE RIGHT EYE 4 TIMES EVERY DAY FOR 2 DAYS AS NEEDED FOR PAIN THEN STOP    EPINEPHrine (EpiPen 2-Toño) 0.3 mg/0.3 mL injection syringe as directed Injection    ergocalciferol (VITAMIN D-2) 50,000 Units, oral, Once Weekly    finasteride (PROSCAR) 5 mg, oral, Daily    metoprolol succinate XL (TOPROL-XL) 25 mg, oral, Daily    simvastatin (ZOCOR) 20 mg, oral, Daily    torsemide (DEMADEX) 30 mg, oral, Daily    vitamins A,C,E-zinc-copper (PreserVision AREDS) 2,148 mcg-113 mg-45 mg-17.4mg tablet Take 1 tablet by mouth once daily.     Subjective   HPI  Review of Systems   Objective   Vitals:    09/13/24 1614   BP: 124/64   Pulse: 52   Temp: 36.2 °C (97.1 °F)   SpO2: 98%      Body mass index is 24.55 kg/m².  Physical Exam  Diagnostic Results   Lab Results   Component Value Date    GLUCOSE 96 02/26/2024    CALCIUM 9.7 02/26/2024     02/26/2024    K 4.3 02/26/2024    CO2 27 02/26/2024     02/26/2024    BUN 38 (H) 02/26/2024    CREATININE 1.70 (H) 02/26/2024     Lab Results   Component Value Date    ALT 9 02/26/2024    AST 15 02/26/2024    ALKPHOS 114 02/26/2024    BILITOT 0.5 02/26/2024     Lab Results   Component Value Date    WBC 6.8 02/26/2024    HGB 12.5 (L) 02/26/2024    HCT 39.7 (L) 02/26/2024    MCV 92 02/26/2024     02/26/2024     Lab Results   Component Value Date    CHOL 111 (L) 02/26/2024    CHOL 129 (L) 02/24/2023    CHOL 123 (L) 02/25/2022     Lab Results   Component Value Date    HDL 58.0 02/26/2024    HDL 59 02/24/2023    HDL 60 02/25/2022  "    Lab Results   Component Value Date    LDLCALC 42 (L) 02/26/2024    LDLCALC 55 (L) 02/24/2023    LDLCALC 49 (L) 02/25/2022     Lab Results   Component Value Date    TRIG 56 02/26/2024    TRIG 73 02/24/2023    TRIG 69 02/25/2022     No components found for: \"CHOLHDL\"  Lab Results   Component Value Date    HGBA1C 5.4 02/26/2024     Other labs not included in the list above were reviewed either before or during this encounter.    History    Past Medical History:   Diagnosis Date    Abnormal gait 09/13/2024    Atherosclerotic heart disease of native coronary artery without angina pectoris     Coronary artery disease without angina pectoris, unspecified vessel or lesion type, unspecified whether native or transplanted heart    Chronic renal failure syndrome 09/13/2024    Finger laceration     left index    Hemarthrosis of knee 09/13/2024    Hematoma 09/13/2024    Hypertension     Lightheadedness 09/13/2024    Memory impairment 09/13/2024    Personal history of other diseases of the circulatory system     History of hypertension    Personal history of other diseases of the circulatory system     History of atrial fibrillation    Slurred speech 09/13/2024     Past Surgical History:   Procedure Laterality Date    MR HEAD ANGIO WO IV CONTRAST  5/12/2018    MR HEAD ANGIO WO IV CONTRAST [facility]    OTHER SURGICAL HISTORY  02/17/2021    Rotator cuff repair    OTHER SURGICAL HISTORY  02/17/2021    Hernia repair    OTHER SURGICAL HISTORY  02/17/2021    Tonsillectomy with adenoidectomy    OTHER SURGICAL HISTORY  02/17/2021    Arterial stent placement     Family History   Problem Relation Name Age of Onset    Cancer Mother      Cancer Sibling       Social History     Socioeconomic History    Marital status:      Spouse name: Not on file    Number of children: Not on file    Years of education: Not on file    Highest education level: Not on file   Occupational History    Not on file   Tobacco Use    Smoking " status: Former     Types: Cigarettes     Passive exposure: Never    Smokeless tobacco: Former   Vaping Use    Vaping status: Never Used   Substance and Sexual Activity    Alcohol use: Not Currently    Drug use: Never    Sexual activity: Defer   Other Topics Concern    Not on file   Social History Narrative    Not on file     Social Determinants of Health     Financial Resource Strain: Not on file   Food Insecurity: Not on file   Transportation Needs: Not on file   Physical Activity: Not on file   Stress: Not on file   Social Connections: Not on file   Intimate Partner Violence: Not on file   Housing Stability: Not on file     Allergies   Allergen Reactions    Ace Inhibitors Angioedema     Current Outpatient Medications on File Prior to Visit   Medication Sig Dispense Refill    acetaminophen (Tylenol Arthritis Pain) 650 mg ER tablet Take 1 tablet (650 mg) by mouth 4 times a day as needed for mild pain (1 - 3), moderate pain (4 - 6) or headaches. Do not crush, chew, or split.      amLODIPine (Norvasc) 10 mg tablet Take 1 tablet (10 mg) by mouth once daily.      aspirin 81 mg EC tablet Take 1 tablet (81 mg) by mouth once daily.      cholecalciferol (Vitamin D-3) 25 MCG (1000 UT) tablet Take 1 tablet (1,000 Units) by mouth once daily.      diclofenac (Voltaren) 0.1 % ophthalmic solution INSTILL 1 DROP IN THE RIGHT EYE 4 TIMES EVERY DAY FOR 2 DAYS AS NEEDED FOR PAIN THEN STOP      EPINEPHrine (EpiPen 2-Toño) 0.3 mg/0.3 mL injection syringe as directed Injection      ergocalciferol (Vitamin D-2) 1.25 MG (97055 UT) capsule Take 1 capsule (50,000 Units) by mouth 1 (one) time per week. 12 capsule 3    finasteride (Proscar) 5 mg tablet Take 1 tablet (5 mg) by mouth once daily.      metoprolol succinate XL (Toprol-XL) 25 mg 24 hr tablet Take 1 tablet (25 mg) by mouth once daily.      simvastatin (Zocor) 20 mg tablet Take 1 tablet (20 mg) by mouth once daily.      torsemide (Demadex) 20 mg tablet Take 1.5 tablets (30 mg) by  mouth once daily.      vitamins A,C,E-zinc-copper (PreserVision AREDS) 2,148 mcg-113 mg-45 mg-17.4mg tablet Take 1 tablet by mouth once daily.       No current facility-administered medications on file prior to visit.     Immunization History   Administered Date(s) Administered    Flu vaccine, quadrivalent, high-dose, preservative free, age 65y+ (FLUZONE) 10/05/2020, 10/13/2021, 09/19/2022    Flu vaccine, trivalent, preservative free, HIGH-DOSE, age 65y+ (Fluzone) 10/08/2016, 10/18/2017, 10/01/2018, 10/21/2019    Influenza, Seasonal, Quadrivalent, Adjuvanted 09/20/2023    Influenza, injectable, quadrivalent 09/20/2011    Influenza, seasonal, injectable 10/25/2011, 10/10/2012, 11/13/2015    Moderna SARS-CoV-2 Vaccination 01/22/2021, 02/19/2021, 10/29/2021, 07/20/2022    Pneumococcal conjugate vaccine, 13-valent (PREVNAR 13) 12/01/2017    Pneumococcal polysaccharide vaccine, 23-valent, age 2 years and older (PNEUMOVAX 23) 12/11/2018    Tdap vaccine, age 7 year and older (BOOSTRIX, ADACEL) 11/08/2019, 09/27/2020, 04/08/2023, 09/29/2023, 09/04/2024    Zoster vaccine, recombinant, adult (SHINGRIX) 04/18/2024     Patient's medical history was reviewed and updated either before or during this encounter.       Leonard Almonte MD

## 2025-01-10 ENCOUNTER — APPOINTMENT (OUTPATIENT)
Dept: OTOLARYNGOLOGY | Facility: CLINIC | Age: OVER 89
End: 2025-01-10
Payer: COMMERCIAL

## 2025-03-14 ENCOUNTER — APPOINTMENT (OUTPATIENT)
Dept: PRIMARY CARE | Facility: CLINIC | Age: OVER 89
End: 2025-03-14
Payer: COMMERCIAL